# Patient Record
Sex: FEMALE | Race: OTHER | Employment: UNEMPLOYED | URBAN - METROPOLITAN AREA
[De-identification: names, ages, dates, MRNs, and addresses within clinical notes are randomized per-mention and may not be internally consistent; named-entity substitution may affect disease eponyms.]

---

## 2021-08-11 ENCOUNTER — HOSPITAL ENCOUNTER (OUTPATIENT)
Dept: LAB | Age: 51
Discharge: HOME | End: 2021-08-11

## 2021-08-11 ENCOUNTER — OFFICE VISIT (OUTPATIENT)
Dept: FAMILY MEDICINE CLINIC | Age: 51
End: 2021-08-11

## 2021-08-11 VITALS
WEIGHT: 166.6 LBS | DIASTOLIC BLOOD PRESSURE: 78 MMHG | TEMPERATURE: 98 F | SYSTOLIC BLOOD PRESSURE: 126 MMHG | HEART RATE: 81 BPM | BODY MASS INDEX: 28.44 KG/M2 | OXYGEN SATURATION: 100 % | HEIGHT: 64 IN

## 2021-08-11 DIAGNOSIS — I49.1 ECTOPIC ATRIAL RHYTHM: ICD-10-CM

## 2021-08-11 DIAGNOSIS — M25.562 ACUTE PAIN OF LEFT KNEE: Primary | ICD-10-CM

## 2021-08-11 DIAGNOSIS — M25.562 ACUTE PAIN OF LEFT KNEE: ICD-10-CM

## 2021-08-11 DIAGNOSIS — R00.2 PALPITATIONS: ICD-10-CM

## 2021-08-11 PROCEDURE — 84443 ASSAY THYROID STIM HORMONE: CPT

## 2021-08-11 PROCEDURE — 80053 COMPREHEN METABOLIC PANEL: CPT

## 2021-08-11 PROCEDURE — 83036 HEMOGLOBIN GLYCOSYLATED A1C: CPT

## 2021-08-11 PROCEDURE — 93000 ELECTROCARDIOGRAM COMPLETE: CPT | Performed by: FAMILY MEDICINE

## 2021-08-11 PROCEDURE — 85025 COMPLETE CBC W/AUTO DIFF WBC: CPT

## 2021-08-11 PROCEDURE — 99203 OFFICE O/P NEW LOW 30 MIN: CPT | Performed by: FAMILY MEDICINE

## 2021-08-11 RX ORDER — CELECOXIB 200 MG/1
200 CAPSULE ORAL 2 TIMES DAILY
Qty: 60 CAPSULE | Refills: 2 | Status: SHIPPED
Start: 2021-08-11 | End: 2021-10-11

## 2021-08-11 NOTE — PROGRESS NOTES
ECG on 8/11/21 at 1451 completed in error, reading was not correct. Reading repeated and recorded on 8/11/21 at [phone] with correct collection of rhythm.   Larisa Perez

## 2021-08-11 NOTE — PROGRESS NOTES
HISTORY OF PRESENT ILLNESS  Ondina Wyatt is a 53 y.o. female presenting with L knee pain and heart palpitations. Also states that her L eye gets red sometimes too. Has had knee pain for about one month. She doesn't remember any injury or fall before the onset of her symptoms. Kyle Addison most when she stands up after sitting for a while. She has not taken anything that helps with the pain but says sometimes massaging helps. Has had palpitations for about 2 months. Has them about 2 times per week. Go on for about 1 minute. No past medical history and takes no medications. No known allergies.      Review of Systems   Eyes: Positive for redness. Respiratory: Negative. Negative for cough and shortness of breath. Cardiovascular: Positive for palpitations. Negative for chest pain. Gastrointestinal: Negative. Negative for abdominal pain, constipation and diarrhea. Musculoskeletal: Positive for joint pain. Neurological: Negative for dizziness, tingling and headaches.      /78 (BP 1 Location: Right arm, BP Patient Position: Sitting)   Pulse 81   Temp 98 °F (36.7 °C)   Ht 5' 4.02\" (1.626 m)   Wt 166 lb 9.6 oz (75.6 kg)   LMP 07/29/2021   SpO2 100%   BMI 28.58 kg/m²      Physical Exam  Constitutional:       Appearance: Normal appearance. HENT:      Right Ear: Tympanic membrane and ear canal normal.      Left Ear: Tympanic membrane and ear canal normal.      Mouth/Throat:      Mouth: Mucous membranes are moist.   Eyes:      Conjunctiva/sclera: Conjunctivae normal.      Pupils: Pupils are equal, round, and reactive to light. Cardiovascular:      Rate and Rhythm: Normal rate and regular rhythm. Pulmonary:      Effort: Pulmonary effort is normal.      Breath sounds: Normal breath sounds. Abdominal:      General: Bowel sounds are normal. There is no distension. Tenderness: There is no abdominal tenderness. Musculoskeletal:      Cervical back: Neck supple.       Comments: Bilateral knees with full ROM, without swelling, and nontender to palpation. Some pain in L knee with anterior drawer test. Posterior drawer, McMurrays, varus and valgus stress tests all negative bilaterally. Skin:     General: Skin is warm and dry. Neurological:      Mental Status: She is alert.       ASSESSMENT and PLAN  Diagnoses and all orders for this visit:     1. Acute pain of left knee  -     CBC WITH AUTOMATED DIFF; Future  -     METABOLIC PANEL, COMPREHENSIVE; Future  -     TSH 3RD GENERATION; Future  -     HEMOGLOBIN A1C WITH EAG; Future     2. Palpitations  -     AMB POC EKG ROUTINE W/ 12 LEADS, INTER & REP     Other orders  -     celecoxib (CELEBREX) 200 mg capsule; Take 1 Capsule by mouth two (2) times a day.           Ms. Femi Hernandez is a 53year old with no significant past medical history who is presenting with 1m of L knee pain and 2m of heart palpitations. 1) Knee pain: Acute. Negative special tests making injury to ligament less likely. -prescribed celecoxib  -Recommended barr tennis shoes instead of sandals to wear around the house     2) Palpitations: twice a week lasting about 30s to 1min.  ECG normal  -ordered TSH, CBC, and CMP       -Hgb A1c for routine screening     Follow up in 1m

## 2021-08-11 NOTE — PROGRESS NOTES
*ATTENTION:  This note has been created by a medical student for educational purposes only. Please do not refer to the content of this note for clinical decision-making, billing, or other purposes. Please see attending physicians note to obtain clinical information on this patient. *         HISTORY OF PRESENT ILLNESS  Ondina Conte is a 53 y.o. female presenting with L knee pain and heart palpitations. Also states that her L eye gets red sometimes too. Has had knee pain for about one month. She doesn't remember any injury or fall before the onset of her symptoms. Arti Tim most when she stands up after sitting for a while. She has not taken anything that helps with the pain but says sometimes massaging helps. Has had palpitations for about 2 months. Has them about 2 times per week. Go on for about 1 minute. No past medical history and takes no medications. No known allergies. Review of Systems   Eyes: Positive for redness. Respiratory: Negative. Negative for cough and shortness of breath. Cardiovascular: Positive for palpitations. Negative for chest pain. Gastrointestinal: Negative. Negative for abdominal pain, constipation and diarrhea. Musculoskeletal: Positive for joint pain. Neurological: Negative for dizziness, tingling and headaches. /78 (BP 1 Location: Right arm, BP Patient Position: Sitting)   Pulse 81   Temp 98 °F (36.7 °C)   Ht 5' 4.02\" (1.626 m)   Wt 166 lb 9.6 oz (75.6 kg)   LMP 07/29/2021   SpO2 100%   BMI 28.58 kg/m²     Physical Exam  Constitutional:       Appearance: Normal appearance. HENT:      Right Ear: Tympanic membrane and ear canal normal.      Left Ear: Tympanic membrane and ear canal normal.      Mouth/Throat:      Mouth: Mucous membranes are moist.   Eyes:      Conjunctiva/sclera: Conjunctivae normal.      Pupils: Pupils are equal, round, and reactive to light. Cardiovascular:      Rate and Rhythm: Normal rate and regular rhythm. Pulmonary:      Effort: Pulmonary effort is normal.      Breath sounds: Normal breath sounds. Abdominal:      General: Bowel sounds are normal. There is no distension. Tenderness: There is no abdominal tenderness. Musculoskeletal:      Cervical back: Neck supple. Comments: Bilateral knees with full ROM, without swelling, and nontender to palpation. Some pain in L knee with anterior drawer test. Posterior drawer, McMurrays, varus and valgus stress tests all negative bilaterally. Skin:     General: Skin is warm and dry. Neurological:      Mental Status: She is alert. ASSESSMENT and PLAN  Diagnoses and all orders for this visit:    1. Acute pain of left knee  -     CBC WITH AUTOMATED DIFF; Future  -     METABOLIC PANEL, COMPREHENSIVE; Future  -     TSH 3RD GENERATION; Future  -     HEMOGLOBIN A1C WITH EAG; Future    2. Palpitations  -     AMB POC EKG ROUTINE W/ 12 LEADS, INTER & REP    Other orders  -     celecoxib (CELEBREX) 200 mg capsule; Take 1 Capsule by mouth two (2) times a day. Ms. Justino Baez is a 53year old with no significant past medical history who is presenting with 1m of L knee pain and 2m of heart palpitations. 1) Knee pain: Acute. Negative special tests making injury to ligament less likely. -prescribed celecoxib  -Recommended barr tennis shoes instead of sandals to wear around the house    2) Palpitations: twice a week lasting about 30s to 1min. No other symptoms but could be secondary to anemia vs thyroid disease vs other arrhythmia.  ECG normal.  -ordered TSH, CBC, and CMP    -Hgb A1c for routine screening    Follow up in [facility]  2:26 PM  08/11/21

## 2021-08-11 NOTE — PROGRESS NOTES
I reviewed with patient medications sent to pharmacy and gave Good RX card. I gave handout to patient that explains the Good RX process. An After Visit Summary was printed and reviewed with the patient.   Roosevelt John

## 2021-08-12 ENCOUNTER — VIRTUAL VISIT (OUTPATIENT)
Dept: FAMILY MEDICINE CLINIC | Age: 51
End: 2021-08-12

## 2021-08-12 ENCOUNTER — TELEPHONE (OUTPATIENT)
Dept: FAMILY MEDICINE CLINIC | Age: 51
End: 2021-08-12

## 2021-08-12 DIAGNOSIS — R71.8 TARGET CELLS PRESENT ON PERIPHERAL BLOOD SMEAR: ICD-10-CM

## 2021-08-12 DIAGNOSIS — Z71.89 COUNSELING AND COORDINATION OF CARE: Primary | ICD-10-CM

## 2021-08-12 DIAGNOSIS — D64.89 ANEMIA DUE TO OTHER CAUSE, NOT CLASSIFIED: Primary | ICD-10-CM

## 2021-08-12 LAB
ALBUMIN SERPL-MCNC: 3.7 G/DL (ref 3.5–5)
ALBUMIN/GLOB SERPL: 0.9 {RATIO} (ref 1.1–2.2)
ALP SERPL-CCNC: 65 U/L (ref 45–117)
ALT SERPL-CCNC: 24 U/L (ref 12–78)
ANION GAP SERPL CALC-SCNC: 3 MMOL/L (ref 5–15)
AST SERPL-CCNC: 13 U/L (ref 15–37)
BASOPHILS # BLD: 0 K/UL (ref 0–0.1)
BASOPHILS NFR BLD: 0 % (ref 0–1)
BILIRUB SERPL-MCNC: 0.3 MG/DL (ref 0.2–1)
BUN SERPL-MCNC: 12 MG/DL (ref 6–20)
BUN/CREAT SERPL: 21 (ref 12–20)
CALCIUM SERPL-MCNC: 9 MG/DL (ref 8.5–10.1)
CHLORIDE SERPL-SCNC: 108 MMOL/L (ref 97–108)
CO2 SERPL-SCNC: 28 MMOL/L (ref 21–32)
CREAT SERPL-MCNC: 0.58 MG/DL (ref 0.55–1.02)
DIFFERENTIAL METHOD BLD: ABNORMAL
EOSINOPHIL # BLD: 0.1 K/UL (ref 0–0.4)
EOSINOPHIL NFR BLD: 1 % (ref 0–7)
ERYTHROCYTE [DISTWIDTH] IN BLOOD BY AUTOMATED COUNT: 19.5 % (ref 11.5–14.5)
EST. AVERAGE GLUCOSE BLD GHB EST-MCNC: 120 MG/DL
GLOBULIN SER CALC-MCNC: 4.3 G/DL (ref 2–4)
GLUCOSE SERPL-MCNC: 83 MG/DL (ref 65–100)
HBA1C MFR BLD: 5.8 % (ref 4–5.6)
HCT VFR BLD AUTO: 27.1 % (ref 35–47)
HGB BLD-MCNC: 7.9 G/DL (ref 11.5–16)
IMM GRANULOCYTES # BLD AUTO: 0 K/UL (ref 0–0.04)
IMM GRANULOCYTES NFR BLD AUTO: 0 % (ref 0–0.5)
LYMPHOCYTES # BLD: 1.9 K/UL (ref 0.8–3.5)
LYMPHOCYTES NFR BLD: 34 % (ref 12–49)
MCH RBC QN AUTO: 19.1 PG (ref 26–34)
MCHC RBC AUTO-ENTMCNC: 29.2 G/DL (ref 30–36.5)
MCV RBC AUTO: 65.6 FL (ref 80–99)
MONOCYTES # BLD: 0.6 K/UL (ref 0–1)
MONOCYTES NFR BLD: 11 % (ref 5–13)
NEUTS SEG # BLD: 3.1 K/UL (ref 1.8–8)
NEUTS SEG NFR BLD: 54 % (ref 32–75)
NRBC # BLD: 0 K/UL (ref 0–0.01)
NRBC BLD-RTO: 0 PER 100 WBC
PLATELET # BLD AUTO: 273 K/UL (ref 150–400)
POTASSIUM SERPL-SCNC: 4.4 MMOL/L (ref 3.5–5.1)
PROT SERPL-MCNC: 8 G/DL (ref 6.4–8.2)
RBC # BLD AUTO: 4.13 M/UL (ref 3.8–5.2)
RBC MORPH BLD: ABNORMAL
SODIUM SERPL-SCNC: 139 MMOL/L (ref 136–145)
TSH SERPL DL<=0.05 MIU/L-ACNC: 1.54 UIU/ML (ref 0.36–3.74)
WBC # BLD AUTO: 5.7 K/UL (ref 3.6–11)

## 2021-08-12 RX ORDER — FERROUS SULFATE 325(65) MG
325 TABLET, DELAYED RELEASE (ENTERIC COATED) ORAL
Qty: 90 TABLET | Refills: 3 | Status: SHIPPED
Start: 2021-08-12 | End: 2021-10-11

## 2021-08-12 NOTE — TELEPHONE ENCOUNTER
Patient informed of test results. Hemoglobin is 7.9,  Low mcv, elevated RDW,  Also target cells present. She is not symptomatic. She has been told she was anemic before (10 years ago). We will refer to hematology for evaluation and management.   She will start taking iron tablets

## 2021-08-16 ENCOUNTER — OFFICE VISIT (OUTPATIENT)
Dept: FAMILY MEDICINE CLINIC | Age: 51
End: 2021-08-16

## 2021-08-16 DIAGNOSIS — Z71.89 COUNSELING AND COORDINATION OF CARE: Primary | ICD-10-CM

## 2021-08-16 PROCEDURE — 99080 SPECIAL REPORTS OR FORMS: CPT | Performed by: FAMILY MEDICINE

## 2021-08-16 NOTE — PROGRESS NOTES
AN financial screening has been completed. Patient has been instructed to call appointment line on or after 8/30/21.

## 2021-10-04 ENCOUNTER — OFFICE VISIT (OUTPATIENT)
Dept: ONCOLOGY | Age: 51
End: 2021-10-04
Payer: SUBSIDIZED

## 2021-10-04 VITALS
RESPIRATION RATE: 16 BRPM | BODY MASS INDEX: 29.3 KG/M2 | HEART RATE: 79 BPM | DIASTOLIC BLOOD PRESSURE: 89 MMHG | OXYGEN SATURATION: 97 % | SYSTOLIC BLOOD PRESSURE: 144 MMHG | HEIGHT: 64 IN | TEMPERATURE: 97.7 F | WEIGHT: 171.6 LBS

## 2021-10-04 DIAGNOSIS — R53.83 OTHER FATIGUE: ICD-10-CM

## 2021-10-04 DIAGNOSIS — M25.562 CHRONIC PAIN OF LEFT KNEE: ICD-10-CM

## 2021-10-04 DIAGNOSIS — G89.29 CHRONIC PAIN OF LEFT KNEE: ICD-10-CM

## 2021-10-04 DIAGNOSIS — D50.9 MICROCYTIC ANEMIA: Primary | ICD-10-CM

## 2021-10-04 PROCEDURE — 99204 OFFICE O/P NEW MOD 45 MIN: CPT | Performed by: INTERNAL MEDICINE

## 2021-10-04 NOTE — PROGRESS NOTES
Vitals:    10/04/21 1343   BP: (!) 144/89   Pulse: 79   Resp: 16   Temp: 97.7 °F (36.5 °C)   SpO2: 97%   Weight: 171 lb 9.6 oz (77.8 kg)   Height: 5' 4\" (1.626 m)         Chief Complaint   Patient presents with    New Patient     Ching Cannon is a 54 y/o female who presents as a new patient here today for anemia. She denies pain.

## 2021-10-04 NOTE — LETTER
10/5/2021    Patient: Lydia Escobar   YOB: 1970   Date of Visit: 10/4/2021     Pito Mejia, [address]  [address] [place] [address]  [place]ia [place] [employer]    Dear Pito Mejia MD,      Thank you for referring Ms. Lydia Escobar to [address] [facility] for evaluation. My notes for this consultation are attached. If you have questions, please do not hesitate to call me. I look forward to following your patient along with you.       Sincerely,    Nick Chu MD

## 2021-10-04 NOTE — PROGRESS NOTES
[facility] [place] at [address], [address] [facility] [address] [facility]  W: [phone]  F: [phone] Patient ID  Name: Cooper العلي  YOB: 1970  MRN: [id number]  Referring Provider:   Destin Altamirano, [address]  [address] [facility]  [address] [facility],  [facility] [address]  Primary Care Provider:   Destin Altamirano MD       HEMATOLOGY/MEDICAL ONCOLOGY  NOTE   Date of Visit: 10/04/21  Reason for Evaluation:     Chief Complaint   Patient presents with    New Patient     Renata Houser is a 52 y/o female who presents as a new patient here today for anemia. She denies pain. Subjective:     History of Present Illness:     Cooper العلي is a 53 y.o. F who presents for an initial evaluation for microcytic anemia. She per  denies any bleeding issues. She last had her menstrual period in September 2021. She has had a normal flow of her vaginal bleeding without bleeding in between periods. She notes a history of fatigue. She reports that she had anemia when she was 12-23 years old. She notes that she had iron deficiency   about 2 months ago. Patient reports that she is dealing with mainly fatigue. She denies any shortness   of breath at rest. Patient reports decreased oral intake on some occasions. She denies any unexplained weight  loss. Denies any nausea/vomiting/diarrhea currently  But has had some nausea in the past.   She notes taking oral iron pills. She did have some upset stomach while on iron. Patient denies any   bowel or bladder problems. Patient denies any pain. Patient reports some shortness of breath with some   activities.   -she has some headaches.  -last iron was taken one week ago.   -out of iron pills  -was prescribed celebrex for left knee pain but only took 1-2 days.  -denies vaginal bleeding outside of menstruation.  -she is here with her daughter, Jaden Rondon.  -she states that she was told that hse had iron deficiency. History reviewed. No pertinent past medical history. History reviewed. No pertinent surgical history. Social History     Tobacco Use    Smoking status: Never Smoker    Smokeless tobacco: Never Used   Substance Use Topics    Alcohol use: Never      Family History   Problem Relation Age of Onset    Kidney Disease Father     Anemia Neg Hx      Current Outpatient Medications   Medication Sig    ferrous sulfate (IRON) 325 mg (65 mg iron) EC tablet Take 1 Tablet by mouth three (3) times daily (with meals). (Patient not taking: Reported on 10/4/2021)    celecoxib (CELEBREX) 200 mg capsule Take 1 Capsule by mouth two (2) times a day. (Patient not taking: Reported on 10/4/2021)     No current facility-administered medications for this visit. No Known Allergies     Review of Systems provided by: patient  General: denies fever and reports fatigue  HEENT: denies epistaxis and denies trouble swallowing  Eyes: denies any vision loss and denies any eye pain  Cardio: denies any active chest pain and denies any leg swelling  Resp: denies any shortness of breath at rest and denies any hemoptysis  Abdomen: denies any nausea, denies any vomiting and denies any diarrhea. Denies hematochezia,melena. Lymph: denies any lymph node enlargement and denies any lymph node tenderness  Skin: denies any rash and denies any itching  MSK: denies any myalgias and denies any arthralgias  Neuro: denies any tremor and reports a history of headaches  Psych: does not report any depression or anxiety    Objective:     Visit Vitals  BP (!) 144/89   Pulse 79   Temp 97.7 °F (36.5 °C)   Resp 16   Ht 5' 4\" (1.626 m) Comment: Patient estimate   Wt 171 lb 9.6 oz (77.8 kg)   LMP 09/20/2021   SpO2 97%   BMI 29.46 kg/m²     ECOG PS: 1- Restricted in physically strenuous activity but ambulatory and able to carry out work of a light or sedentary nature, e.g., light house work, office work.   Physical Exam  Constitutional: No acute distress. , Non-toxic appearance. and Non-diaphoretic. HENT: Normocephalic and atraumatic head. Eyes: Normal Conjunctivae., Palpebral Conjunctivae without any significant Pallor. and Anicteric sclerae. Cardiovascular: Normal heart sounds., No pitting edema., No friction rub. and No gallops auscultated. Pulmonary: Normal Respiratory Effort., No wheezing., No rhonchi. and No rales. Abdominal: Normal bowel sounds. , Soft Abdomen to palpation. and No abdominal tenderness. Skin: No jaundice. and No rash. Musculoskeletal: No muscle pain on palpation. No temporal muscle wasting on inspection. Neurological: Alert and oriented to person, place, and time. Mental status is at baseline. , No tremor on inspection. and Normal Gait. Psychiatric: mood normal. normal speech rate and normal affect    Results:   I personally reviewed pertinent labs/results:   -No other labs. I personally reviewed Epic EHR labs/results below:   Lab Results   Component Value Date/Time    WBC 5.7 08/11/2021 07:45 PM    HGB 7.9 (L) 08/11/2021 07:45 PM    HCT 27.1 (L) 08/11/2021 07:45 PM    PLATELET 763 03/19/5386 07:45 PM    MCV 65.6 (L) 08/11/2021 07:45 PM    ABS. NEUTROPHILS 3.1 08/11/2021 07:45 PM     Lab Results   Component Value Date/Time    Sodium 139 08/11/2021 07:45 PM    Potassium 4.4 08/11/2021 07:45 PM    Chloride 108 08/11/2021 07:45 PM    CO2 28 08/11/2021 07:45 PM    Glucose 83 08/11/2021 07:45 PM    BUN 12 08/11/2021 07:45 PM    Creatinine 0.58 08/11/2021 07:45 PM    GFR est AA >60 08/11/2021 07:45 PM    GFR est non-AA >60 08/11/2021 07:45 PM    Calcium 9.0 08/11/2021 07:45 PM     I personally reviewed pertinent referral notes:  Note from Dr. Pito Mejia: \"Anemia, Low mcv, high RDW, target cells present, refer for evaluation and management\"  Assessment and Recommendations:     Diagnoses and all orders for this visit:    1. Microcytic anemia  56-year-old female with reported iron deficiency anemia.   In available outside records I do not see any iron labs but patient states that she was told she has iron deficiency and has recently completed a course of oral iron. I discussed with her that we need to repeat her iron stores to see if she has any current iron deficiency. I explained her that it is imperative that the root cause of any iron deficiency anemia be determined. We discussed that without any significant bleeding changes in her menstrual periods other etiologies need to be evaluated especially gastroenterology work-up. I would recommend that patient be referred to gastroenterology. She has never had a screening colonoscopy. Once I see that her Iron Stores are adequate, we may have to order a Hemoglobin Electrophoresis to evaluate for any hemoglobinopathy.  -     PERIPHERAL SMEAR; Future  -     FOLATE; Future  -     VITAMIN B12; Future  -     IRON PROFILE; Future  -     FERRITIN; Future  -     CBC WITH AUTOMATED DIFF; Future    2. Other fatigue  Patient's hemoglobin on 8/11/2021 was 7.9. We will repeat a CBC today to see if she has had any improvement. She still has some fatigue complaints. We discussed that if she is having ongoing bleeding then oral iron repletion may not be adequate and she may need parenteral IV iron sucrose weekly x3-4 treatments. 3. Chronic pain of left knee  No overt swelling at left knee site. Advised her to follow-up in primary care especially if her symptoms persist or she develops swelling concerning for any DVT which does not seem apparent at present. She has had this pain for about 2 months. There is some consideration that she was to take Celebrex but she only took 2 days. In the setting of possible GI bleeding as a cause of her iron deficiency I would recommend that patient proceed with avoiding NSAID's and aspirin if possible. Follow-up and Dispositions    · Return in about 5 weeks (around 11/8/2021).        Signed By:   Josie Cates MD

## 2021-10-05 PROBLEM — R53.83 OTHER FATIGUE: Status: ACTIVE | Noted: 2021-10-05

## 2021-10-05 PROBLEM — M25.562 CHRONIC PAIN OF LEFT KNEE: Status: ACTIVE | Noted: 2021-10-04

## 2021-10-05 PROBLEM — M25.562 CHRONIC PAIN OF LEFT KNEE: Status: ACTIVE | Noted: 2021-10-05

## 2021-10-05 PROBLEM — D50.9 MICROCYTIC ANEMIA: Status: ACTIVE | Noted: 2021-10-05

## 2021-10-05 PROBLEM — G89.29 CHRONIC PAIN OF LEFT KNEE: Status: ACTIVE | Noted: 2021-10-04

## 2021-10-05 PROBLEM — G89.29 CHRONIC PAIN OF LEFT KNEE: Status: ACTIVE | Noted: 2021-10-05

## 2021-10-11 ENCOUNTER — OFFICE VISIT (OUTPATIENT)
Dept: CARDIOLOGY CLINIC | Age: 51
End: 2021-10-11
Payer: SUBSIDIZED

## 2021-10-11 ENCOUNTER — TELEPHONE (OUTPATIENT)
Dept: CARDIOLOGY CLINIC | Age: 51
End: 2021-10-11

## 2021-10-11 VITALS
DIASTOLIC BLOOD PRESSURE: 60 MMHG | HEART RATE: 95 BPM | OXYGEN SATURATION: 99 % | BODY MASS INDEX: 29.37 KG/M2 | RESPIRATION RATE: 18 BRPM | HEIGHT: 64 IN | WEIGHT: 172 LBS | SYSTOLIC BLOOD PRESSURE: 100 MMHG

## 2021-10-11 DIAGNOSIS — R00.2 PALPITATIONS: Primary | ICD-10-CM

## 2021-10-11 DIAGNOSIS — D50.9 MICROCYTIC ANEMIA: ICD-10-CM

## 2021-10-11 PROCEDURE — 99204 OFFICE O/P NEW MOD 45 MIN: CPT | Performed by: INTERNAL MEDICINE

## 2021-10-11 PROCEDURE — 93000 ELECTROCARDIOGRAM COMPLETE: CPT | Performed by: INTERNAL MEDICINE

## 2021-10-11 NOTE — PROGRESS NOTES
Visit Vitals  /60   Pulse 75   Resp 18   Ht 5' 4\" (1.626 m)   Wt 172 lb (78 kg)   LMP 09/20/2021   SpO2 99%   BMI 29.52 kg/m²

## 2021-10-11 NOTE — LETTER
10/11/2021    Patient: Esther Lawler   YOB: 1970   Date of Visit: 10/11/2021     Felice Abbott, [address] [facility]  [address] [address]  [address] [place]    Dear Felice Abbott MD,      Thank you for referring Ms. Esther Lawler to [facility] for evaluation. My notes for this consultation are attached. If you have questions, please do not hesitate to call me. I look forward to following your patient along with you.       Sincerely,    Deena Borges MD

## 2021-10-11 NOTE — PROGRESS NOTES
Cheryln Denver, MD., [facility]    [address] [facility] [place], [address] [facility]    Office [phone],UZS [phone]           Ching Hartmann is a 53 y.o. female referred for evaluation of palpitations. Consult requested by Leana Almonte MD    CC - as documented in EMR    Dear Dr. Leana Almonte MD    I had the pleasure of seeing Ms. Efren Mehta in the office today. Assessment:   Palpitations  Anemia-hemoglobin 7.9 8/11/2021. Normal TSH. Plan:      Palpitations probably secondary to underlying anemia. Hemoglobin has improved from 7.9-10.3 (10/4/2021)  Echocardiogram.  Event monitor-7-day monitor  Followed by hematology-has been diagnosed with ideations anemia  Aggressive cardiovascular risk factor medication. Follow-up 6 to 8 weeks/earlier as needed. Reviewed PCP office note 8/11/2021-history of palpitations for approximately 2 months. Lab work 8/11/2021-K4.4, creatinine 0.58 TSH 1.54, hemoglobin 7.9,     Patient understands the plan. All questions were answered to the patient's satisfaction. I appreciate the opportunity to be involved in Ms. Nohelia Lowe. See note below for details. Please do not hesitate to contact us   with questions or concerns. Cheryln Denver, MD      Cardiac Testing/ Procedures: A. Cardiac Cath/PCI:    B.ECHO/KIRAN:    C.StressNuclear/Stress ECHO/Stress test:    D.Vascular:    E. EP:    F. Miscellaneous:    History:     Efren Mehta is a 53 y.o. female who is referred for evaluation of palpitations. Patient speaks some English but her daughter at the patient's request acts as a . Patient speaks Citizen of the [place]. History of intermittent palpitations for the past 2-1/2 months. Complains of weakness when she has the episodes. Can occur 2-3 times a week. She has to sit down when she has the palpitations. No dizziness/syncope. No chest pain/dyspnea. No swelling lower extremities.   No prior history of CAD/arrhythmia. Has been taking iron tablets for iron deficiency anemia. Past Medical/Surgical and Family/Social History:       PMHx - Iron def anemia    Family History   Problem Relation Age of Onset    Kidney Disease Father     Anemia Neg Hx       Social History     Tobacco Use    Smoking status: Never Smoker    Smokeless tobacco: Never Used   Substance Use Topics    Alcohol use: Never    Drug use: Never            Medications:       No current outpatient medications on file. No current facility-administered medications for this visit. Review of Systems:     (bold if positive, if negative)    Gen:   fatigueEyes:  ENT:  CVS:  Pulm:  GI:  :  MS:  arthritisSkin:  Psych:  anxietyEndo:  Hem:  Renal:  Neuro:  Physical Exam:     Visit Vitals  /60   Pulse 95   Resp 18   Ht 5' 4\" (1.626 m)   Wt 172 lb (78 kg)   LMP 09/20/2021   SpO2 99%   BMI 29.52 kg/m²          Gen: Well-developed, well-nourished, in no acute distress  Neck: Supple,No JVD, No Carotid Bruit,   Resp: No accessory muscle use, Clear breath sounds, No rales or rhonchi  Card: Regular Rate,Rythm,Normal S1, S2, No murmurs, rubs or gallop. No thrills. Abd:  Soft, non-tender, non-distended,BS+,   MSK: No cyanosis  Skin: No rashes    Neuro: moving all four extremities , follows commands appropriately  Psych:  Good insight, oriented to person, place , alert, Nml Affect  LE: No edema    EKG: Sinus rhythm, normal axis, normal intervals, nonspecific ST-T changes      Medication Side Effects and Warnings were discussed with patient: yes  Patient Labs were reviewed and/or requested:  yes  Patient Past Records were reviewed and/or requested: yes    Total time:       mins    ATTENTION:   This medical record was transcribed using an electronic medical records/speech recognition system. Although proofread, it may and can contain electronic, spelling and other errors. Corrections may be executed at a later time.   Please feel free to contact us for any clarifications as needed.       Wilfrido Pollard MD

## 2021-11-04 ENCOUNTER — DOCUMENTATION ONLY (OUTPATIENT)
Dept: ONCOLOGY | Age: 51
End: 2021-11-04

## 2021-11-04 NOTE — PROGRESS NOTES
[address] at [facility]  [phone]    11/04/2021 at 1:24 pm--This user attempted to call [facility] in regards to the referral and got a message stating that their office was closed for lunch from 1:00 pm-2:00pm. This user will attempt to call back at a later time. 11/04/2021 at 2:03 pm--This user called [facility], the office of Red Schaffer, and had to leave a voicemail message with our office information stating for someone to call me back about a mutual patient.

## 2021-11-04 NOTE — PROGRESS NOTES
Please see if patient has been referred to GI by Primary Care for Colonoscopy/ work-up for Iron Deficiency Anemia yet.     Thanks,    Dr. Daniel Distance

## 2021-11-09 DIAGNOSIS — D50.9 MICROCYTIC ANEMIA: Primary | ICD-10-CM

## 2021-11-10 ENCOUNTER — OFFICE VISIT (OUTPATIENT)
Dept: ONCOLOGY | Age: 51
End: 2021-11-10
Payer: SUBSIDIZED

## 2021-11-10 VITALS
TEMPERATURE: 96.6 F | WEIGHT: 174.4 LBS | BODY MASS INDEX: 29.78 KG/M2 | HEART RATE: 78 BPM | DIASTOLIC BLOOD PRESSURE: 84 MMHG | OXYGEN SATURATION: 100 % | SYSTOLIC BLOOD PRESSURE: 125 MMHG | HEIGHT: 64 IN

## 2021-11-10 DIAGNOSIS — D50.8 IRON DEFICIENCY ANEMIA SECONDARY TO INADEQUATE DIETARY IRON INTAKE: ICD-10-CM

## 2021-11-10 DIAGNOSIS — D50.0 IRON DEFICIENCY ANEMIA DUE TO CHRONIC BLOOD LOSS: Primary | ICD-10-CM

## 2021-11-10 PROCEDURE — 99213 OFFICE O/P EST LOW 20 MIN: CPT | Performed by: INTERNAL MEDICINE

## 2021-11-10 RX ORDER — FERROUS SULFATE 325(65) MG
325 TABLET, DELAYED RELEASE (ENTERIC COATED) ORAL 2 TIMES DAILY
Qty: 60 TABLET | Refills: 2 | Status: SHIPPED | OUTPATIENT
Start: 2021-11-10

## 2021-11-10 NOTE — PATIENT INSTRUCTIONS
PLEASE CALL US IF YOU DO NOT HEAR FROM GASTROENTEROLOGY BY NEXT WEEK SINCE YOU NEED TO HAVE AT LEAST A COLONOSCOPY. Orders Only on 10/04/2021   Component Date Value Ref Range Status    WBC 10/04/2021 6.4  3.6 - 11.0 K/uL Final    RBC 10/04/2021 4.80  3.80 - 5.20 M/uL Final    HGB 10/04/2021 10.3* 11.5 - 16.0 g/dL Final    HCT 10/04/2021 34.3* 35.0 - 47.0 % Final    MCV 10/04/2021 71.5* 80.0 - 99.0 FL Final    MCH 10/04/2021 21.5* 26.0 - 34.0 PG Final    MCHC 10/04/2021 30.0  30.0 - 36.5 g/dL Final    RDW 10/04/2021 23.7* 11.5 - 14.5 % Final    PLATELET 87/26/8863 756  150 - 400 K/uL Final    NRBC 10/04/2021 0.0  0  WBC Final    ABSOLUTE NRBC 10/04/2021 0.00  0.00 - 0.01 K/uL Final    NEUTROPHILS 10/04/2021 60  32 - 75 % Final    LYMPHOCYTES 10/04/2021 25  12 - 49 % Final    MONOCYTES 10/04/2021 11  5 - 13 % Final    EOSINOPHILS 10/04/2021 4  0 - 7 % Final    BASOPHILS 10/04/2021 0  0 - 1 % Final    IMMATURE GRANULOCYTES 10/04/2021 0  % Final    ABS. NEUTROPHILS 10/04/2021 3.8  1.8 - 8.0 K/UL Final    ABS. LYMPHOCYTES 10/04/2021 1.6  0.8 - 3.5 K/UL Final    ABS. MONOCYTES 10/04/2021 0.7  0.0 - 1.0 K/UL Final    ABS. EOSINOPHILS 10/04/2021 0.3  0.0 - 0.4 K/UL Final    ABS. BASOPHILS 10/04/2021 0.0  0.0 - 0.1 K/UL Final    ABS. IMM. GRANS.  10/04/2021 0.0  K/UL Final    DF 10/04/2021 MANUAL    Final    RBC COMMENTS 10/04/2021     Final                    Value:ANISOCYTOSIS  3+      RBC COMMENTS 10/04/2021     Final                    Value:HYPOCHROMIA  2+      RBC COMMENTS 10/04/2021     Final                    Value:MICROCYTOSIS  1+      Ferritin 10/04/2021 5* 26 - 388 NG/ML Final    Iron 10/04/2021 24* 35 - 150 ug/dL Final    TIBC 10/04/2021 489* 250 - 450 ug/dL Final    Iron % saturation 10/04/2021 5* 20 - 50 % Final    Vitamin B12 10/04/2021 685  193 - 986 pg/mL Final    Folate 10/04/2021 31.5* 5.0 - 21.0 ng/mL Final    PERIPHERAL SMEAR 10/04/2021 (NOTE)   Final APR-;PSM21 226     Review of the blood smear shows anemia with anisopoikilocytosis. Hypochromic microcytic forms, elliptocytes and target forms are noted. Schistocytes are not increased. There is minimal polychromasia and no   nucleated red blood cells are identified. Leukocytes are normal in number   and morphology. Platelets are unremarkable.

## 2021-11-10 NOTE — PROGRESS NOTES
[facility] at [address], [address] [facility] [facility]  Carole Ayush: [phone]  F: [phone] Patient ID  Name: Loida Mattson  YOB: 1970  MRN: [id number]  Referring Provider:   No referring provider defined for this encounter. Primary Care Provider:   Red Schaffer MD       HEMATOLOGY/MEDICAL ONCOLOGY  NOTE   Date of Visit: 11/10/21  Reason for Evaluation:     Chief Complaint   Patient presents with    Follow-up     5 weeks f/u   [id number]    Subjective:     History of Present Illness:     Loida Mattson is a 49 y.o. F who presents for a follow-up evaluation for Iron Deficiency Anemia. Patient reports doing well overall. She denies any fevers. She denies any significant fatigue. She does admit that she has not been on iron after she ran out. Apparently, she uses the mobile primary care [facility] for her primary care. She tells me that she has not been referred to gastroenterology yet. History reviewed. No pertinent past medical history. History reviewed. No pertinent surgical history. Social History     Tobacco Use    Smoking status: Never Smoker    Smokeless tobacco: Never Used   Substance Use Topics    Alcohol use: Never      Family History   Problem Relation Age of Onset    Kidney Disease Father     Anemia Neg Hx      No current outpatient medications on file. No current facility-administered medications for this visit. No Known Allergies     Review of Systems: A complete review of systems was obtained, reviewed. Pertinent findings reviewed above.       Objective:     Visit Vitals  /84 (BP 1 Location: Left upper arm, BP Patient Position: Sitting, BP Cuff Size: Adult)   Pulse (!) 41   Temp (!) 96.6 °F (35.9 °C) (Temporal)   Ht 5' 4\" (1.626 m)   Wt 174 lb 6.4 oz (79.1 kg)   LMP 10/13/2021 (Exact Date)   SpO2 100%   BMI 29.94 kg/m²     ECOG PS: 1- Restricted in physically strenuous activity but ambulatory and able to carry out work of a light or sedentary nature, e.g., light house work, office work. Physical Exam  Constitutional: No acute distress. , Non-toxic appearance. and Non-diaphoretic. HENT: Normocephalic and atraumatic head. Eyes: Normal Conjunctivae. and Palpebral Conjunctivae with Pallor. Cardiovascular: Normal heart sounds., No pitting edema., No friction rub. and No gallops auscultated. Pulmonary: Normal Respiratory Effort., No wheezing., No rhonchi. and No rales. Abdominal: Normal bowel sounds. , Soft Abdomen to palpation. , No abdominal tenderness., No guarding. and No rebound tenderness. Skin: No jaundice., No rash. and No petechiae. Musculoskeletal: No muscle pain on palpation. No temporal muscle wasting on inspection. Lymph: No cervical or supraclavicular lymph node enlargement or tenderness. Neurological: Alert and oriented to person, place, and time. Mental status is at baseline. and No tremor on inspection. Psychiatric: mood normal. normal speech rate and normal affect      Results:     Lab Results   Component Value Date/Time    WBC 6.4 10/04/2021 02:34 PM    HGB 10.3 (L) 10/04/2021 02:34 PM    HCT 34.3 (L) 10/04/2021 02:34 PM    PLATELET 453 32/70/3861 02:34 PM    MCV 71.5 (L) 10/04/2021 02:34 PM     Lab Results   Component Value Date/Time    Iron 24 (L) 10/04/2021 02:34 PM    TIBC 489 (H) 10/04/2021 02:34 PM    Iron % saturation 5 (L) 10/04/2021 02:34 PM    Ferritin 5 (L) 10/04/2021 02:34 PM     Lab Results   Component Value Date/Time    Vitamin B12 685 10/04/2021 02:34 PM    Folate 31.5 (H) 10/04/2021 02:34 PM         Assessment and Recommendations:     Diagnoses and all orders for this visit:    1. Iron deficiency anemia secondary to inadequate dietary iron intake  -     ferrous sulfate (IRON) 325 mg (65 mg iron) EC tablet; Take 1 Tablet by mouth two (2) times a day. Restart Oral Iron. Referred to Gastroenterology.   She will return in January 2022 for reassessment to see that her iron stores are improving. She has no difficulty taking oral iron other than access issue and she asks us to provide a prescription for her iron. She understands to take it 2x/day. More importantly, she understood the need to follow-through with Colonscopy evaluation and possibly EGD with GI. Follow-up and Dispositions    · Return in about 8 weeks (around 1/5/2022).              Signed By:   Peri Huggins MD

## 2021-11-10 NOTE — PROGRESS NOTES
Chief Complaint   Patient presents with    Follow-up     5 weeks f/u       Visit Vitals  /84 (BP 1 Location: Left upper arm, BP Patient Position: Sitting, BP Cuff Size: Adult)   Pulse (!) 41   Temp (!) 96.6 °F (35.9 °C) (Temporal)   Ht 5' 4\" (1.626 m)   Wt 174 lb 6.4 oz (79.1 kg)   LMP 10/13/2021 (Exact Date)   SpO2 100%   BMI 29.94 kg/m²       1. Have you been to the ER, urgent care clinic since your last visit? Hospitalized since your last visit? No    2. Have you seen or consulted any other health care providers outside of the [address] [place] [address] since your last visit? Include any pap smears or colon screening.  No

## 2021-11-10 NOTE — LETTER
11/15/2021    Patient: Jeff Garcia   YOB: 1970   Date of Visit: 11/10/2021     Nellie King, [address] [facility] [address]  [address] [facility],[address] [address]  [address] [employer]    Dear Nellie King MD,      Thank you for referring Ms. Jeff Garcia to [facility] for evaluation. My notes for this consultation are attached. If you have questions, please do not hesitate to call me. I look forward to following your patient along with you.       Sincerely,    Ino Saunders MD

## 2021-11-16 ENCOUNTER — TELEPHONE (OUTPATIENT)
Dept: ONCOLOGY | Age: 51
End: 2021-11-16

## 2021-11-16 NOTE — TELEPHONE ENCOUNTER
[address] at [place]  [phone]    11/16/21 11:15 AM -  Maria Luz Mccabe and spoke with the patient via  aHttie Zambrano - #[id number]). Advised patient now has GI appointment set up for Tuesday, December 7 at 2:45 PM.  Provided address to patient ([address] [place] [address]). Advised their office should be in touch with the patient soon to discuss information needed for records. Patient voiced understanding. No further questions or concerns.

## 2021-11-18 ENCOUNTER — DOCUMENTATION ONLY (OUTPATIENT)
Dept: CARDIOLOGY CLINIC | Age: 51
End: 2021-11-18

## 2021-11-18 NOTE — PROGRESS NOTES
Event monitor 11/1/2021-11/7/2021-  Minimum heart rate 59 bpm, maximum heart rate 129 bpm, no A. fib, no pauses,  Symptoms of skipped beats/flutter correlated with sinus rhythm/PVC    Next appointment 11/22/2021

## 2021-11-22 ENCOUNTER — ANCILLARY PROCEDURE (OUTPATIENT)
Dept: CARDIOLOGY CLINIC | Age: 51
End: 2021-11-22

## 2021-11-22 ENCOUNTER — OFFICE VISIT (OUTPATIENT)
Dept: CARDIOLOGY CLINIC | Age: 51
End: 2021-11-22
Payer: SUBSIDIZED

## 2021-11-22 VITALS
HEART RATE: 87 BPM | SYSTOLIC BLOOD PRESSURE: 120 MMHG | HEIGHT: 64 IN | WEIGHT: 176 LBS | DIASTOLIC BLOOD PRESSURE: 68 MMHG | BODY MASS INDEX: 30.05 KG/M2 | OXYGEN SATURATION: 100 %

## 2021-11-22 VITALS
SYSTOLIC BLOOD PRESSURE: 120 MMHG | DIASTOLIC BLOOD PRESSURE: 68 MMHG | WEIGHT: 174 LBS | BODY MASS INDEX: 29.71 KG/M2 | HEIGHT: 64 IN

## 2021-11-22 DIAGNOSIS — R00.2 PALPITATIONS: Primary | ICD-10-CM

## 2021-11-22 DIAGNOSIS — R00.2 PALPITATIONS: ICD-10-CM

## 2021-11-22 LAB
ECHO AO ASC DIAM: 3.07 CM
ECHO AO ROOT DIAM: 2.97 CM
ECHO AV AREA PEAK VELOCITY: 2.29 CM2
ECHO AV AREA VTI: 2.3 CM2
ECHO AV AREA/BSA PEAK VELOCITY: 1.2 CM2/M2
ECHO AV AREA/BSA VTI: 1.3 CM2/M2
ECHO AV MEAN GRADIENT: 5.49 MMHG
ECHO AV PEAK GRADIENT: 10.12 MMHG
ECHO AV PEAK VELOCITY: 159.02 CM/S
ECHO AV VTI: 30.34 CM
ECHO EST RA PRESSURE: 3 MMHG
ECHO IVC PROX: 1.58 CM
ECHO LA AREA 4C: 21.27 CM2
ECHO LA MAJOR AXIS: 3.89 CM
ECHO LA MINOR AXIS: 2.11 CM
ECHO LA VOL 2C: 67.71 ML (ref 22–52)
ECHO LA VOL 4C: 63.61 ML (ref 22–52)
ECHO LA VOL BP: 69.19 ML (ref 22–52)
ECHO LA VOL/BSA BIPLANE: 37.6 ML/M2 (ref 16–28)
ECHO LA VOLUME INDEX A2C: 36.8 ML/M2 (ref 16–28)
ECHO LA VOLUME INDEX A4C: 34.57 ML/M2 (ref 16–28)
ECHO LV E' LATERAL VELOCITY: 12.42 CM/S
ECHO LV E' SEPTAL VELOCITY: 9.63 CM/S
ECHO LV INTERNAL DIMENSION DIASTOLIC: 5.16 CM (ref 3.9–5.3)
ECHO LV INTERNAL DIMENSION SYSTOLIC: 3.14 CM
ECHO LV IVSD: 0.6 CM (ref 0.6–0.9)
ECHO LV MASS 2D: 111.6 G (ref 67–162)
ECHO LV MASS INDEX 2D: 60.7 G/M2 (ref 43–95)
ECHO LV POSTERIOR WALL DIASTOLIC: 0.71 CM (ref 0.6–0.9)
ECHO LVOT DIAM: 1.86 CM
ECHO LVOT PEAK GRADIENT: 7.22 MMHG
ECHO LVOT PEAK VELOCITY: 134.35 CM/S
ECHO LVOT SV: 69.9 ML
ECHO LVOT VTI: 25.83 CM
ECHO MV A VELOCITY: 79.93 CM/S
ECHO MV E DECELERATION TIME (DT): 187.89 MS
ECHO MV E VELOCITY: 97.48 CM/S
ECHO MV E/A RATIO: 1.22
ECHO MV E/E' LATERAL: 7.85
ECHO MV E/E' RATIO (AVERAGED): 8.99
ECHO MV E/E' SEPTAL: 10.12
ECHO MV EROA PISA: 0.17 CM2
ECHO MV MAX VELOCITY: 104.79 CM/S
ECHO MV MEAN GRADIENT: 2.07 MMHG
ECHO MV PEAK GRADIENT: 4.39 MMHG
ECHO MV PRESSURE HALF TIME (PHT): 54.49 MS
ECHO MV REGURGITANT RADIUS PISA: 0.57 CM
ECHO MV REGURGITANT VOLUME: 25.2 ML
ECHO MV REGURGITANT VTIA: 145.21 CM
ECHO MV VTI: 24.21 CM
ECHO RA AREA 4C: 19.29 CM2
ECHO RIGHT VENTRICULAR SYSTOLIC PRESSURE (RVSP): 31.68 MMHG
ECHO RV INTERNAL DIMENSION: 3.81 CM
ECHO RV TAPSE: 2.24 CM (ref 1.5–2)
ECHO TV REGURGITANT MAX VELOCITY: 267.76 CM/S
ECHO TV REGURGITANT PEAK GRADIENT: 28.68 MMHG
MR PISA PV: 498.55 CM/S

## 2021-11-22 PROCEDURE — 99213 OFFICE O/P EST LOW 20 MIN: CPT | Performed by: INTERNAL MEDICINE

## 2021-11-22 PROCEDURE — 93306 TTE W/DOPPLER COMPLETE: CPT | Performed by: INTERNAL MEDICINE

## 2021-11-22 NOTE — LETTER
11/22/2021    Patient: Daphney Mckinney   YOB: 1970   Date of Visit: 11/22/2021     Concha Serrano, [address] [place] [address]  [address] [place]    Dear Concha Serrano MD,      Thank you for referring Ms. Daphney Mckinney to [facility] for evaluation. My notes for this consultation are attached. If you have questions, please do not hesitate to call me. I look forward to following your patient along with you.       Sincerely,    Ronak Zaman MD

## 2021-11-22 NOTE — PROGRESS NOTES
Loida Mattson is a 49 y.o. female    Chief Complaint   Patient presents with    Follow-up     6 week f/u     Palpitations       Chest pain No; palpitations     SOB No    Dizziness No    Swelling No    Refills No    Visit Vitals  /68 (BP 1 Location: Left upper arm, BP Patient Position: Sitting)   Pulse 87   Ht 5' 4\" (1.626 m)   Wt 176 lb (79.8 kg)   SpO2 100%   BMI 30.21 kg/m²       1. Have you been to the ER, urgent care clinic since your last visit? Hospitalized since your last visit? No    2. Have you seen or consulted any other health care providers outside of the [address] [place] [address] since your last visit? Include any pap smears or colon screening.   No

## 2021-11-22 NOTE — PROGRESS NOTES
Nichole Aparicio MD., [facility]    [address] [place], [address] [facility]    Office [phone],K [phone]           [address] [place] [address] is here for a f/u office visit. Primary care physician:  Jose Burton MD    CC - as documented in EMR    Dear Dr. Jose Burton MD    I had the pleasure of seeing Ms. Gayla Mary Melrose in the office today. Assessment:     Palpitations - resolved  AnemiaOn iron supplements. Normal TSH      Plan:      Palpitations probably secondary to underlying anemia. Hemoglobin 10.3 (10/4/2021)  Echocardiogram - nml EF  Event monitor-7-day monitor -no sig arrhythymias  Followed by hematology-has been diagnosed with iron def anemia  Aggressive cardiovascular risk factor medication. Follow-up 1 yr/earlier as needed. Patient understands the plan. All questions were answered to the patient's satisfaction. I appreciate the opportunity to be involved in Ms. Amari Zepeda. See note below for details. Please do not hesitate to contact us with questions or concerns. Nichole Aparicio MD      Cardiac Testing/ Procedures: A. Cardiac Cath/PCI:     B.ECHO/KIRAN: 11/22/21 LV: Estimated LVEF is 65 - 70%. Visually measured ejection fraction. Normal cavity size, wall thickness, systolic function (ejection fraction normal) and diastolic function. Wall motion: normal.LA: Mildly dilated left atrium. Left Atrium volume index is 38 mL/m2. MV: Mild mitral valve regurgitation is present. TV: Mild tricuspid valve regurgitation is present. Right Ventricular Arterial Pressure (RVSP) is 32 mmHg. C.StressNuclear/Stress ECHO/Stress test:    D.Vascular:    E. EP:11/18/21 Event monitor 11/1/2021-11/7/2021-  Minimum heart rate 59 bpm, maximum heart rate 129 bpm, no A. fib, no pauses,  Symptoms of skipped beats/flutter correlated with sinus rhythm/PVC       F.  Miscellaneous:    History:     [address] [place] [address] is a 49 y.o. female who returns for follow up visit. No CP/dyspnea/swelling LE/palpitaitons  Daughter acts as     ( Initial visit 10/11/21 - Vickie Bianchi is a 53 y.o. female who is referred for evaluation of palpitations. Patient speaks some English but her daughter at the patient's request acts as a . Patient speaks Serbian. History of intermittent palpitations for the past 2-1/2 months. Complains of weakness when she has the episodes. Can occur 2-3 times a week. She has to sit down when she has the palpitations. No dizziness/syncope. No chest pain/dyspnea. No swelling lower extremities. No prior history of CAD/arrhythmia. Has been taking iron tablets for iron deficiency anemia.)    ROS:  (bold if positive, if negative)           Medications:       Current Outpatient Medications   Medication Sig Dispense    ferrous sulfate (IRON) 325 mg (65 mg iron) EC tablet Take 1 Tablet by mouth two (2) times a day. 60 Tablet     No current facility-administered medications for this visit. Family History of CAD:    No    Social History:  Current  Smoker  No    Physical Exam:     Visit Vitals  /68 (BP 1 Location: Left upper arm, BP Patient Position: Sitting)   Pulse 87   Ht 5' 4\" (1.626 m)   Wt 176 lb (79.8 kg)   SpO2 100%   BMI 30.21 kg/m²          Gen: Well-developed, well-nourished, in no acute distress  Neck: Supple,No JVD, No Carotid Bruit,   Resp: No accessory muscle use, Clear breath sounds, No rales or rhonchi  Card: Regular Rate,Rythm,Normal S1, S2, No murmurs, rubs or gallop. No thrills.    Abd:  Soft, non-tender, non-distended,BS+,   MSK: No cyanosis  Skin: No rashes    Neuro: moving all four extremities , follows commands appropriately  Psych:  Good insight, oriented to person, place , alert, Nml Affect  LE: No edema    EKG:       Medication Side Effects and Warnings were discussed with patient: yes  Patient Labs were reviewed and/or requested:  yes  Patient Past Records were reviewed and/or requested: yes    Total time :        mins    ATTENTION:   This medical record was transcribed using an electronic medical records/speech recognition system. Although proofread, it may and can contain electronic, spelling and other errors. Corrections may be executed at a later time. Please feel free to contact us for any clarifications as needed.       Lake Frausto MD

## 2021-12-07 ENCOUNTER — TELEPHONE (OUTPATIENT)
Dept: CARDIOLOGY CLINIC | Age: 51
End: 2021-12-07

## 2021-12-07 NOTE — TELEPHONE ENCOUNTER
Laury Gaucher with [employer] called to advise that the patient was billed for the monitor. She states [employer] will take the charges off if they attain a letter from the order provider stating that the office did not bill for the device.         [phone]:

## 2021-12-08 NOTE — TELEPHONE ENCOUNTER
L/m for Siddhartha Escobar returning call regarding the loop monitor she wore & doing a n/c. Need the fax# & who it is going to so I can do the letter.

## 2021-12-08 NOTE — TELEPHONE ENCOUNTER
Prasanna Kate from  [employer] called to provide the following information:         Send to: [employer] [facility]    Fax: [phone]    Email: [email or web address]

## 2021-12-09 NOTE — TELEPHONE ENCOUNTER
Spoke to our local [employer] rep about this kayleigh letter. After looking I had submitted it originally on 10/12/21 & was told it was approved. Sent him a copy of letter again. Jayshree Elkins will check into this.

## 2021-12-10 NOTE — TELEPHONE ENCOUNTER
Received message from Guillermo Pickering the [facility] rep that pts kayleigh loop was approved.

## 2022-01-06 ENCOUNTER — OFFICE VISIT (OUTPATIENT)
Dept: ONCOLOGY | Age: 52
End: 2022-01-06
Payer: SUBSIDIZED

## 2022-01-06 VITALS
RESPIRATION RATE: 18 BRPM | HEART RATE: 82 BPM | OXYGEN SATURATION: 99 % | DIASTOLIC BLOOD PRESSURE: 85 MMHG | WEIGHT: 179 LBS | TEMPERATURE: 98 F | BODY MASS INDEX: 30.56 KG/M2 | HEIGHT: 64 IN | SYSTOLIC BLOOD PRESSURE: 131 MMHG

## 2022-01-06 DIAGNOSIS — H57.89 RED EYE: ICD-10-CM

## 2022-01-06 DIAGNOSIS — D50.8 OTHER IRON DEFICIENCY ANEMIA: Primary | ICD-10-CM

## 2022-01-06 DIAGNOSIS — N92.0 MENORRHAGIA WITH REGULAR CYCLE: ICD-10-CM

## 2022-01-06 PROBLEM — Z79.899 HIGH RISK MEDICATION USE: Status: ACTIVE | Noted: 2022-01-06

## 2022-01-06 PROBLEM — D50.9 IRON DEFICIENCY ANEMIA: Status: ACTIVE | Noted: 2022-01-06

## 2022-01-06 PROCEDURE — 99214 OFFICE O/P EST MOD 30 MIN: CPT | Performed by: INTERNAL MEDICINE

## 2022-01-06 RX ORDER — DIPHENHYDRAMINE HYDROCHLORIDE 50 MG/ML
50 INJECTION, SOLUTION INTRAMUSCULAR; INTRAVENOUS AS NEEDED
Status: CANCELLED
Start: 2022-01-10

## 2022-01-06 RX ORDER — ACETAMINOPHEN 325 MG/1
650 TABLET ORAL AS NEEDED
Status: CANCELLED
Start: 2022-01-10

## 2022-01-06 RX ORDER — EPINEPHRINE 1 MG/ML
0.3 INJECTION, SOLUTION, CONCENTRATE INTRAVENOUS AS NEEDED
Status: CANCELLED | OUTPATIENT
Start: 2022-01-10

## 2022-01-06 RX ORDER — SODIUM CHLORIDE 9 MG/ML
10 INJECTION INTRAMUSCULAR; INTRAVENOUS; SUBCUTANEOUS AS NEEDED
Status: CANCELLED | OUTPATIENT
Start: 2022-01-10

## 2022-01-06 RX ORDER — DIPHENHYDRAMINE HYDROCHLORIDE 50 MG/ML
25 INJECTION, SOLUTION INTRAMUSCULAR; INTRAVENOUS AS NEEDED
Status: CANCELLED
Start: 2022-01-10

## 2022-01-06 RX ORDER — SODIUM CHLORIDE 0.9 % (FLUSH) 0.9 %
10 SYRINGE (ML) INJECTION AS NEEDED
Status: CANCELLED | OUTPATIENT
Start: 2022-01-10

## 2022-01-06 RX ORDER — HEPARIN 100 UNIT/ML
300-500 SYRINGE INTRAVENOUS AS NEEDED
Status: CANCELLED
Start: 2022-01-10

## 2022-01-06 RX ORDER — ONDANSETRON 2 MG/ML
8 INJECTION INTRAMUSCULAR; INTRAVENOUS AS NEEDED
Status: CANCELLED | OUTPATIENT
Start: 2022-01-10

## 2022-01-06 RX ORDER — HYDROCORTISONE SODIUM SUCCINATE 100 MG/2ML
100 INJECTION, POWDER, FOR SOLUTION INTRAMUSCULAR; INTRAVENOUS AS NEEDED
Status: CANCELLED | OUTPATIENT
Start: 2022-01-10

## 2022-01-06 RX ORDER — ALBUTEROL SULFATE 0.83 MG/ML
2.5 SOLUTION RESPIRATORY (INHALATION) AS NEEDED
Status: CANCELLED
Start: 2022-01-10

## 2022-01-06 NOTE — PROGRESS NOTES
[facility] [address], [address] [facility] [address] [place] Rank: [phone]  F: [phone] Patient ID  Name: Jing Rios  YOB: 1970  MRN: [id number]  Referring Provider:   No referring provider defined for this encounter. Primary Care Provider:   Ivy Wan MD       HEMATOLOGY/MEDICAL ONCOLOGY  NOTE   Date of Visit: 01/06/22  Reason for Evaluation:     Chief Complaint   Patient presents with    Anemia     Subjective:     History of Present Illness:     Jing Rios is a 49 y.o. F who presents for a follow-up evaluation for Iron Deficiency Anemia.   [employer] #[id number] intepreter used. Feels tired. Not taking iron oral pills; reportedly ran out of medication about 2 weeks ago. Denies any fevers. Has had red eye episodes over the past few months that comes and goes. Denies any known rheumatology conditions. Reportedly set for endoscopies with GI in February 2022. History reviewed. No pertinent past medical history. History reviewed. No pertinent surgical history. Social History     Tobacco Use    Smoking status: Never Smoker    Smokeless tobacco: Never Used   Substance Use Topics    Alcohol use: Never      Family History   Problem Relation Age of Onset    Kidney Disease Father     Anemia Neg Hx      Current Outpatient Medications   Medication Sig    ferrous sulfate (IRON) 325 mg (65 mg iron) EC tablet Take 1 Tablet by mouth two (2) times a day. (Patient not taking: Reported on 1/6/2022)     No current facility-administered medications for this visit. No Known Allergies     Review of Systems Provided by:  Patient  Review of Systems: A complete review of systems was obtained, reviewed. Pertinent findings reviewed above.       Objective:     Visit Vitals  /85   Pulse 82   Temp 98 °F (36.7 °C) (Temporal)   Resp 18   Ht 5' 4\" (1.626 m)   Wt 179 lb (81.2 kg)   SpO2 99%   BMI 30.73 kg/m²     ECOG PS: 1- Restricted in physically strenuous activity but ambulatory and able to carry out work of a light or sedentary nature, e.g., light house work, office work. Physical Exam  Constitutional: No acute distress. and Non-toxic appearance. HENT: Normocephalic and atraumatic head. and Wearing a mask during COVID-19 precautions. Eyes: Anicteric sclerae. and Left Lateral eye redness (see picture below)  Cardiovascular: Normal heart sounds. and No pitting edema. Pulmonary: Normal Respiratory Effort., No wheezing., No rhonchi. and No rales. Abdominal: Normal bowel sounds. , Soft Abdomen to palpation. and No abdominal tenderness. Skin: No jaundice. and No rash. Musculoskeletal: No muscle pain on palpation. No temporal muscle wasting on inspection. Neurological: Alert and oriented to person, place, and time. Mental status is at baseline. and No tremor on inspection. Psychiatric: mood normal. normal speech rate and normal affect    Media Information          Document Information    Photographic Image:  Mobile Media Capture   Left Eye   01/06/2022 11:05   Attached To:     Source Information    Elinor Rivera MD  [facility]     Results:     Lab Results   Component Value Date/Time    WBC 6.4 10/04/2021 02:34 PM    HGB 10.3 (L) 10/04/2021 02:34 PM    HCT 34.3 (L) 10/04/2021 02:34 PM    PLATELET 469 36/99/7757 02:34 PM    MCV 71.5 (L) 10/04/2021 02:34 PM     Lab Results   Component Value Date/Time    Iron 24 (L) 10/04/2021 02:34 PM    TIBC 489 (H) 10/04/2021 02:34 PM    Iron % saturation 5 (L) 10/04/2021 02:34 PM    Ferritin 5 (L) 10/04/2021 02:34 PM     Lab Results   Component Value Date/Time    Vitamin B12 685 10/04/2021 02:34 PM    Folate 31.5 (H) 10/04/2021 02:34 PM         Assessment and Recommendations:     Diagnoses and all orders for this visit:    1. Other iron deficiency anemia  -     CBC WITH AUTOMATED DIFF; Future  -     FERRITIN; Future  -     IRON PROFILE;  Future  -Discussed with patient and family in attendance at visit today that does not seem that she is taking her iron as recommended and keeping up with it. We discussed that the plan would be to proceed forward with systemic IV iron repletion. We we will repeat her ferritin today and her iron profile. Unless there is a significant improvement then we will proceed with IV iron since I am concerned she is not repleting appropriately. Strongly recommend that she keep her GI appointments. Today, I counseled patient about the risks associated with IV iron repletion. I provided her handout about IV iron in Telugu. She provided verbal oral consent to proceed with IV iron sucrose. We discussed that therapy would be weekly for several weeks. We discussed the risk of GI symptoms and rarely the risk of anaphylaxis. However, IV iron is generally tolerated well. 2. Red eye  -     REFERRAL TO OPHTHALMOLOGY  -New problem: Discussed with patient that I recommend she follow-up with an eye care provider. We will place the referral to ophthalmology as well to see if she needs evaluated for some type of scleritis. 3. Menorrhagia with regular cycle  -Continue recommend follow-up with gynecology. Follow-up and Dispositions    · Return in about 8 weeks (around 2/28/2022). SCHEDULE SUPPORTIVE PLAN  FOUR total weekly treatments Iron Sucrose starting 1/10/2021. SCHEDULE SUPPORTIVE PLAN  FOUR total weekly treatments Iron Sucrose starting 1/10/2021.     Print After Visit Summary    Signed By:   Wallie Goodpasture, MD

## 2022-01-06 NOTE — LETTER
1/10/2022    Patient: Freda Rouse   YOB: 1970   Date of Visit: 1/6/2022     German Corona MD  [facility] [address]  V[address]    Dear German Corona MD,      Thank you for referring Ms. Freda Rouse to [facility] for evaluation. My notes for this consultation are attached. If you have questions, please do not hesitate to call me. I look forward to following your patient along with you.       Sincerely,    Lincoln Falcon MD

## 2022-01-06 NOTE — PATIENT INSTRUCTIONS
Escleritis: Instrucciones de cuidado  Scleritis: Care Instructions  Instrucciones de cuidado    La esclerótica es la parte lucien del shanae. Es diogo cubierta fibrosa que constituye gran parte del globo ocular. La esclerótica protege las partes internas del shanae. Escleritis significa que la esclerótica está inflamada. La inflamación es lo que hace que la parte lucien del shanae se rashi enrojecida o, a veces, morada. El dolor de la escleritis suele ser intenso y es peor por la noche. El dolor puede extenderse a la christiana y la mandíbula. La escleritis puede [employer] ojos estén sensibles a la juan. También puede afectar la vista. En [place] graves, puede provocar pérdida de la vista. La escleritis a menudo está causada por otro problema médico, aditya la artritis reumatoide. A veces, la causa es diogo infección en el shanae. Para determinar si usted tiene escleritis, andres médico le revisa la vista. Kristina Hitch cuidadosamente los ojos. Andres médico también le pregunta sobre otros síntomas aditya dolor articular o episodios de [place]. Es posible que le rosa pruebas y radiografías para detectar problemas médicos que pueden causar escleritis. Cuando la escleritis está en la parte posterior del shanae, puede ser más difícil de diagnosticar. El tratamiento se concentra en reducir la inflamación. Andres médico puede darle un medicamento antiinflamatorio no esteroideo (Miguel Yanes). Si el problema es grave, un medicamento esteroideo podría ayudar. Rickie vez también necesite medicamentos para tratar la causa, aditya un antibiótico para diogo infección o medicamentos para problemas del sistema inmunitario. Con tratamiento, la escleritis a veces puede irse en pocas semanas. Kvng puede durar [employer], incluso [place] martina. La atención de seguimiento es diogo parte clave de andres tratamiento y seguridad. Asegúrese de hacer y acudir a todas las citas, y llame a andres médico si está teniendo problemas.  También es diogo buena idea saber los resultados de vida exámenes y mantener diogo lista de los medicamentos que candis. ¿Cómo puede cuidarse en el hogar? · Sea aruna con los medicamentos. Use vida medicamentos exactamente aditya le fueron recetados. Llame a andres médico si piensa que tiene un problema con andres medicamento. · Si está usando gotas para los ojos o [address] [place], asegúrese de que el gotero o el extremo del frasco esté limpio. Para ponerse gotas para los ojos o diogo pomada:  ? Incline la isaac para atrás y tire el párpado inferior hacia abajo con un dedo. ? Eche unas gotitas del medicamento o aplíquelo dentro del párpado inferior. ? Cierre el shanae por entre 30 y 61 segundos para dejar que las gotas o la pomada se deslicen por el shanae. ? No deje que la punta del gotero o del tubo de pomada toque vida pestañas ni ninguna otra superficie. · No use lentes de contacto sino hasta que el médico le diga que puede usarlos en forma blue nuevamente. · Use gafas de sol para ayudar a aliviar el dolor a causa de las luces hermilo. · Jordi a andres médico por controles con la frecuencia que tenga programada. ¿Cuándo debe pedir ayuda? Llame a andres médico ahora mismo o busque atención médica inmediata si:    · Usted nota que ve menos.     · Le cambia la vista.     · Tiene un dolor nuevo o peor en el shanae.     · Tiene un enrojecimiento nuevo o peor en el shanae. Preste especial atención a los cambios en andres niall y asegúrese de comunicarse con andres médico si:    · No mejora aditya se esperaba. ¿Dónde puede encontrar más información en inglés? Vaya a http://www.gray.com/  Krystian [id number] en la búsqueda para aprender más acerca de \"Escleritis: Instrucciones de cuidado. \"  Revisado: 29 abril, 2021               Versión del contenido: 13.0  © 2679-6110 Healthwise, Incorporated. Las instrucciones de cuidado fueron adaptadas bajo licencia por [employer] (which disclaims liability or warranty for this information).  Si usted tiene [place] afección médica o [employer] estas instrucciones, siempre pregunte a andres profesional de niall. [employer] niega toda garantía o responsabilidad por nadres uso de esta información. Afia sacarosa (Por inyección)   Trata la anemia (deficiencia de afia). Brendon(s) : [facility] Venofer, Venofer   Existen muchas otras marcas de [place]. Chuy medicamento no debe ser usado cuando:   Chuy medicamento no es adecuado para todas las personas. Usted no debería usarlo si ha tenido diogo reacción alérgica a la sacarosa de afia. Forma de usar chuy medicamento:   Claude Klein  · Andres médico le prescribirá andres dosis y horario. Chyu medicamento se administra a través de diogo aguja en la vena. · Марина Charlie u otro médico le administrará chuy medicamento. Medicamentos y [employer] que debe evitar:      Consulte con andres médico o farmacéutico antes de usar cualquier medicamento, incluyendo los que compra sin receta médica, las vitaminas y los productos herbales. Precauciones arie el uso de chuy medicamento:   · Informe a andres médico si usted está embarazada o dando de lactar, o si usted tiene presión arterial baja. · Chuy medicamento podría reducir andres presión arterial demasiado y puede provocarle mareos o desvanecimiento. Levántese o siéntese despacio si está mareado. · Chuy medicamento puede aumentar en exceso el nivel de afia en la [place]. El médico solicitará exámenes de laboratorio arie las citas de rutina para revisar los efectos de Joel. Asista a todas vida citas.   Efectos secundarios que pueden presentarse arie el uso de chuy medicamento:   Consulte inmediatamente con el médico si nota cualquiera de estos efectos secundarios:  · Reacción alérgica: Marzella Bloodgood o ronchas, hinchazón del patrick o las tony, hinchazón u hormigueo en la boca o garganta, opresión en el pecho, dificultad para respirar  · Dolor de pecho  · Desvanecimientos, mareos o desmayos  Consulte con el médico si nota los siguientes [place] secundarios menos graves:   · Diarrea, náuseas, vómitos  · Dolor de isaac  · [facility]  · Dolor en saravia [place]  · Dolor, picazón, ardor, inflamación o diogo protuberancia en saravia piel donde se colocó la aguja  Consulte con el médico si nota otros efectos secundarios que shayla son causados por chuy medicamento. Llame a saravia médico para consultarle Rashad. Usted puede notificar vida efectos secundarios al FDA al 3-930-RDX-0069. © 2017 [address] Information is for End User's use only and may not be sold, redistributed or otherwise used for commercial purposes. Esta información es sólo para uso en educación. Saravia intención no es darle un consejo médico sobre enfermedades o tratamientos. Colsulte con saravia [employer] [employer] farmacéutico antes de seguir cualquier régimen médico para saber si es seguro y efectivo para usted.

## 2022-01-06 NOTE — PROGRESS NOTES
Freda Rouse is a 49 y.o. female Follow up for the evaluation of Anemia. 1. Have you been to the ER, urgent care clinic since your last visit? Hospitalized since your last visit? No    2. Have you seen or consulted any other health care providers outside of the [address] [place] [address] since your last visit? Include any pap smears or colon screening.  No

## 2022-01-10 ENCOUNTER — HOSPITAL ENCOUNTER (OUTPATIENT)
Dept: INFUSION THERAPY | Age: 52
Discharge: HOME | End: 2022-01-10
Payer: SUBSIDIZED

## 2022-01-10 VITALS
RESPIRATION RATE: 18 BRPM | DIASTOLIC BLOOD PRESSURE: 65 MMHG | TEMPERATURE: 97 F | HEART RATE: 85 BPM | OXYGEN SATURATION: 97 % | SYSTOLIC BLOOD PRESSURE: 128 MMHG

## 2022-01-10 DIAGNOSIS — D50.8 OTHER IRON DEFICIENCY ANEMIA: Primary | ICD-10-CM

## 2022-01-10 PROCEDURE — 96365 THER/PROPH/DIAG IV INF INIT: CPT

## 2022-01-10 PROCEDURE — 74011250636 HC RX REV CODE- 250/636: Performed by: INTERNAL MEDICINE

## 2022-01-10 PROCEDURE — 74011000258 HC RX REV CODE- 258: Performed by: INTERNAL MEDICINE

## 2022-01-10 RX ORDER — SODIUM CHLORIDE 9 MG/ML
10 INJECTION INTRAMUSCULAR; INTRAVENOUS; SUBCUTANEOUS AS NEEDED
Status: DISCONTINUED | OUTPATIENT
Start: 2022-01-10 | End: 2022-01-11 | Stop reason: HOSPADM

## 2022-01-10 RX ORDER — HEPARIN 100 UNIT/ML
300-500 SYRINGE INTRAVENOUS AS NEEDED
Status: DISCONTINUED | OUTPATIENT
Start: 2022-01-10 | End: 2022-01-11 | Stop reason: HOSPADM

## 2022-01-10 RX ORDER — SODIUM CHLORIDE 0.9 % (FLUSH) 0.9 %
10 SYRINGE (ML) INJECTION AS NEEDED
Status: DISCONTINUED | OUTPATIENT
Start: 2022-01-10 | End: 2022-01-11 | Stop reason: HOSPADM

## 2022-01-10 RX ADMIN — IRON SUCROSE 200 MG: 20 INJECTION, SOLUTION INTRAVENOUS at 16:26

## 2022-01-10 NOTE — PROGRESS NOTES
[facility] Progress Note    Date: January 10, 2022    Name: Angeles Lee    MRN: [id number]         : 1970    Ms. Tracy Tim Arrived ambulatory and in no distress for Venofer Infusion. Assessment was completed alongside interpretor #[id number], patient reports mild headache and fatigue. 24 G PIV established to left arm, + blood return. Ms. Raul Smalls vitals were reviewed.   Visit Vitals  /70   Pulse 78   Temp 98.7 °F (37.1 °C)   Resp 18   SpO2 100%   Breastfeeding No     Medications:  Medications Administered     iron sucrose (VENOFER) 200 mg in 0.9% sodium chloride 100 mL, overfill volume 10 mL IVPB     Admin Date  01/10/2022 Action  New Bag Dose  200 mg Rate  120 mL/hr Route  IntraVENous Administered By  Kellee Way RN              SBAR given to [address] [place] [address] Street, RN  January 10, 2022

## 2022-01-10 NOTE — PROGRESS NOTES
SBAR received from [facility]. Pt is sitting without complaints, receiving infusion. Patient's daughter reports receiving informational handout from provider. VS  Patient Vitals for the past 12 hrs:   Temp Pulse Resp BP SpO2   01/10/22 1745 97 °F (36.1 °C) 85 18 128/65 97 %   01/10/22 1605 98.7 °F (37.1 °C) 78 18 133/70 100 %           1750 Patient tolerated treatment well. Patient discharged from [facility] 58 in no distress.  Patient aware of next appointment on 1/17 at 10

## 2022-01-17 ENCOUNTER — HOSPITAL ENCOUNTER (OUTPATIENT)
Dept: INFUSION THERAPY | Age: 52
Discharge: HOME | End: 2022-01-17
Payer: SUBSIDIZED

## 2022-01-17 VITALS
SYSTOLIC BLOOD PRESSURE: 121 MMHG | TEMPERATURE: 98.2 F | HEIGHT: 64 IN | OXYGEN SATURATION: 100 % | HEART RATE: 78 BPM | WEIGHT: 179.3 LBS | DIASTOLIC BLOOD PRESSURE: 70 MMHG | BODY MASS INDEX: 30.61 KG/M2 | RESPIRATION RATE: 16 BRPM

## 2022-01-17 DIAGNOSIS — D50.8 OTHER IRON DEFICIENCY ANEMIA: Primary | ICD-10-CM

## 2022-01-17 PROCEDURE — 74011000258 HC RX REV CODE- 258: Performed by: INTERNAL MEDICINE

## 2022-01-17 PROCEDURE — 74011000250 HC RX REV CODE- 250: Performed by: INTERNAL MEDICINE

## 2022-01-17 PROCEDURE — 74011250636 HC RX REV CODE- 250/636: Performed by: INTERNAL MEDICINE

## 2022-01-17 PROCEDURE — 96365 THER/PROPH/DIAG IV INF INIT: CPT

## 2022-01-17 RX ORDER — HYDROCORTISONE SODIUM SUCCINATE 100 MG/2ML
100 INJECTION, POWDER, FOR SOLUTION INTRAMUSCULAR; INTRAVENOUS AS NEEDED
Status: CANCELLED | OUTPATIENT
Start: 2022-01-17

## 2022-01-17 RX ORDER — DIPHENHYDRAMINE HYDROCHLORIDE 50 MG/ML
50 INJECTION, SOLUTION INTRAMUSCULAR; INTRAVENOUS AS NEEDED
Status: CANCELLED
Start: 2022-01-17

## 2022-01-17 RX ORDER — ACETAMINOPHEN 325 MG/1
650 TABLET ORAL AS NEEDED
Status: CANCELLED
Start: 2022-01-17

## 2022-01-17 RX ORDER — ALBUTEROL SULFATE 0.83 MG/ML
2.5 SOLUTION RESPIRATORY (INHALATION) AS NEEDED
Status: CANCELLED
Start: 2022-01-17

## 2022-01-17 RX ORDER — ONDANSETRON 2 MG/ML
8 INJECTION INTRAMUSCULAR; INTRAVENOUS AS NEEDED
Status: CANCELLED | OUTPATIENT
Start: 2022-01-17

## 2022-01-17 RX ORDER — DIPHENHYDRAMINE HYDROCHLORIDE 50 MG/ML
25 INJECTION, SOLUTION INTRAMUSCULAR; INTRAVENOUS AS NEEDED
Status: CANCELLED
Start: 2022-01-17

## 2022-01-17 RX ORDER — SODIUM CHLORIDE 0.9 % (FLUSH) 0.9 %
10 SYRINGE (ML) INJECTION AS NEEDED
Status: DISPENSED | OUTPATIENT
Start: 2022-01-17 | End: 2022-01-17

## 2022-01-17 RX ORDER — EPINEPHRINE 1 MG/ML
0.3 INJECTION, SOLUTION, CONCENTRATE INTRAVENOUS AS NEEDED
Status: CANCELLED | OUTPATIENT
Start: 2022-01-17

## 2022-01-17 RX ORDER — HEPARIN 100 UNIT/ML
300-500 SYRINGE INTRAVENOUS AS NEEDED
Status: CANCELLED
Start: 2022-01-17

## 2022-01-17 RX ORDER — SODIUM CHLORIDE 9 MG/ML
10 INJECTION INTRAMUSCULAR; INTRAVENOUS; SUBCUTANEOUS AS NEEDED
Status: CANCELLED | OUTPATIENT
Start: 2022-01-17

## 2022-01-17 RX ADMIN — Medication 10 ML: at 11:53

## 2022-01-17 RX ADMIN — Medication 10 ML: at 10:15

## 2022-01-17 RX ADMIN — IRON SUCROSE 200 MG: 20 INJECTION, SOLUTION INTRAVENOUS at 10:18

## 2022-01-17 NOTE — PROGRESS NOTES
[facility] Progress Note    Date: 2022    Name: Jean Santana    MRN: [id number]         : 1970    Ms. Ayesha Zuluaga arrived ambulatory and in no distress for C1D8 Venofer Infusion. Assessment was completed, no acute issues at this time, no new complaints voiced. 24 G PIV established to right arm, + blood return. Ms. Mac Bailey vitals were reviewed. Patient Vitals for the past 24 hrs:   Temp Pulse Resp BP SpO2   22 1150 98.2 °F (36.8 °C) 78 16 121/70 --   22 0958 98 °F (36.7 °C) 84 20 135/72 100 %         Medications:  Medications Administered     iron sucrose (VENOFER) 200 mg in 0.9% sodium chloride 100 mL, overfill volume 10 mL IVPB     Admin Date  2022 Action  New Bag Dose  200 mg Rate  120 mL/hr Route  IntraVENous Administered By  Melissa Frost RN          sodium chloride (NS) flush 10 mL     Admin Date  2022 Action  Given Dose  10 mL Route  IntraVENous Administered By  Melissa Frost RN           Admin Date  2022 Action  Given Dose  10 mL Route  IntraVENous Administered By  Melissa Frost RN                Ms. Ayesha Zuluaga tolerated treatment well and was discharged from [place] [address] in stable condition at 1255, after 20 minutes of post-infusion monitoring. PIV flushed & removed. She is to return on  at 1000 for her next appointment.     Ad Bradley RN  2022

## 2022-01-18 ENCOUNTER — TELEPHONE (OUTPATIENT)
Dept: ONCOLOGY | Age: 52
End: 2022-01-18

## 2022-01-18 NOTE — TELEPHONE ENCOUNTER
PT speaks Fijian and called about getting financial aide to help with her getting her infusion treatments- please advise a CB# [phone]

## 2022-01-24 ENCOUNTER — HOSPITAL ENCOUNTER (OUTPATIENT)
Dept: INFUSION THERAPY | Age: 52
Discharge: HOME | End: 2022-01-24
Payer: SUBSIDIZED

## 2022-01-24 VITALS
DIASTOLIC BLOOD PRESSURE: 75 MMHG | BODY MASS INDEX: 30.35 KG/M2 | RESPIRATION RATE: 16 BRPM | HEART RATE: 71 BPM | SYSTOLIC BLOOD PRESSURE: 128 MMHG | WEIGHT: 177.8 LBS | OXYGEN SATURATION: 100 % | HEIGHT: 64 IN | TEMPERATURE: 98.1 F

## 2022-01-24 DIAGNOSIS — D50.8 OTHER IRON DEFICIENCY ANEMIA: Primary | ICD-10-CM

## 2022-01-24 PROCEDURE — 74011000250 HC RX REV CODE- 250: Performed by: INTERNAL MEDICINE

## 2022-01-24 PROCEDURE — 74011000258 HC RX REV CODE- 258: Performed by: INTERNAL MEDICINE

## 2022-01-24 PROCEDURE — 96365 THER/PROPH/DIAG IV INF INIT: CPT

## 2022-01-24 PROCEDURE — 74011250636 HC RX REV CODE- 250/636: Performed by: INTERNAL MEDICINE

## 2022-01-24 RX ORDER — ONDANSETRON 2 MG/ML
8 INJECTION INTRAMUSCULAR; INTRAVENOUS AS NEEDED
Status: CANCELLED | OUTPATIENT
Start: 2022-01-24

## 2022-01-24 RX ORDER — SODIUM CHLORIDE 0.9 % (FLUSH) 0.9 %
10 SYRINGE (ML) INJECTION AS NEEDED
Status: DISPENSED | OUTPATIENT
Start: 2022-01-24 | End: 2022-01-24

## 2022-01-24 RX ORDER — DIPHENHYDRAMINE HYDROCHLORIDE 50 MG/ML
50 INJECTION, SOLUTION INTRAMUSCULAR; INTRAVENOUS AS NEEDED
Status: CANCELLED
Start: 2022-01-31

## 2022-01-24 RX ORDER — HEPARIN 100 UNIT/ML
300-500 SYRINGE INTRAVENOUS AS NEEDED
Status: ACTIVE | OUTPATIENT
Start: 2022-01-24 | End: 2022-01-24

## 2022-01-24 RX ORDER — DIPHENHYDRAMINE HYDROCHLORIDE 50 MG/ML
25 INJECTION, SOLUTION INTRAMUSCULAR; INTRAVENOUS AS NEEDED
Status: CANCELLED
Start: 2022-01-31

## 2022-01-24 RX ORDER — SODIUM CHLORIDE 9 MG/ML
10 INJECTION INTRAMUSCULAR; INTRAVENOUS; SUBCUTANEOUS AS NEEDED
Status: CANCELLED | OUTPATIENT
Start: 2022-01-31

## 2022-01-24 RX ORDER — SODIUM CHLORIDE 9 MG/ML
10 INJECTION INTRAMUSCULAR; INTRAVENOUS; SUBCUTANEOUS AS NEEDED
Status: CANCELLED | OUTPATIENT
Start: 2022-01-24

## 2022-01-24 RX ORDER — HYDROCORTISONE SODIUM SUCCINATE 100 MG/2ML
100 INJECTION, POWDER, FOR SOLUTION INTRAMUSCULAR; INTRAVENOUS AS NEEDED
Status: CANCELLED | OUTPATIENT
Start: 2022-01-24

## 2022-01-24 RX ORDER — SODIUM CHLORIDE 0.9 % (FLUSH) 0.9 %
10 SYRINGE (ML) INJECTION AS NEEDED
Status: CANCELLED | OUTPATIENT
Start: 2022-01-31

## 2022-01-24 RX ORDER — EPINEPHRINE 1 MG/ML
0.3 INJECTION, SOLUTION, CONCENTRATE INTRAVENOUS AS NEEDED
Status: CANCELLED | OUTPATIENT
Start: 2022-01-24

## 2022-01-24 RX ORDER — HEPARIN 100 UNIT/ML
300-500 SYRINGE INTRAVENOUS AS NEEDED
Status: CANCELLED
Start: 2022-01-31

## 2022-01-24 RX ORDER — HYDROCORTISONE SODIUM SUCCINATE 100 MG/2ML
100 INJECTION, POWDER, FOR SOLUTION INTRAMUSCULAR; INTRAVENOUS AS NEEDED
Status: CANCELLED | OUTPATIENT
Start: 2022-01-31

## 2022-01-24 RX ORDER — ONDANSETRON 2 MG/ML
8 INJECTION INTRAMUSCULAR; INTRAVENOUS AS NEEDED
Status: CANCELLED | OUTPATIENT
Start: 2022-01-31

## 2022-01-24 RX ORDER — ACETAMINOPHEN 325 MG/1
650 TABLET ORAL AS NEEDED
Status: CANCELLED
Start: 2022-01-31

## 2022-01-24 RX ORDER — ACETAMINOPHEN 325 MG/1
650 TABLET ORAL AS NEEDED
Status: CANCELLED
Start: 2022-01-24

## 2022-01-24 RX ORDER — HEPARIN 100 UNIT/ML
300-500 SYRINGE INTRAVENOUS AS NEEDED
Status: CANCELLED
Start: 2022-01-24

## 2022-01-24 RX ORDER — SODIUM CHLORIDE 9 MG/ML
10 INJECTION INTRAMUSCULAR; INTRAVENOUS; SUBCUTANEOUS AS NEEDED
Status: ACTIVE | OUTPATIENT
Start: 2022-01-24 | End: 2022-01-24

## 2022-01-24 RX ORDER — ALBUTEROL SULFATE 0.83 MG/ML
2.5 SOLUTION RESPIRATORY (INHALATION) AS NEEDED
Status: CANCELLED
Start: 2022-01-31

## 2022-01-24 RX ORDER — DIPHENHYDRAMINE HYDROCHLORIDE 50 MG/ML
50 INJECTION, SOLUTION INTRAMUSCULAR; INTRAVENOUS AS NEEDED
Status: CANCELLED
Start: 2022-01-24

## 2022-01-24 RX ORDER — EPINEPHRINE 1 MG/ML
0.3 INJECTION, SOLUTION, CONCENTRATE INTRAVENOUS AS NEEDED
Status: CANCELLED | OUTPATIENT
Start: 2022-01-31

## 2022-01-24 RX ORDER — SODIUM CHLORIDE 0.9 % (FLUSH) 0.9 %
10 SYRINGE (ML) INJECTION AS NEEDED
Status: CANCELLED | OUTPATIENT
Start: 2022-01-24

## 2022-01-24 RX ORDER — ALBUTEROL SULFATE 0.83 MG/ML
2.5 SOLUTION RESPIRATORY (INHALATION) AS NEEDED
Status: CANCELLED
Start: 2022-01-24

## 2022-01-24 RX ORDER — DIPHENHYDRAMINE HYDROCHLORIDE 50 MG/ML
25 INJECTION, SOLUTION INTRAMUSCULAR; INTRAVENOUS AS NEEDED
Status: CANCELLED
Start: 2022-01-24

## 2022-01-24 RX ADMIN — IRON SUCROSE 200 MG: 20 INJECTION, SOLUTION INTRAVENOUS at 11:08

## 2022-01-24 RX ADMIN — SODIUM CHLORIDE, PRESERVATIVE FREE 10 ML: 5 INJECTION INTRAVENOUS at 12:18

## 2022-01-24 RX ADMIN — SODIUM CHLORIDE, PRESERVATIVE FREE 10 ML: 5 INJECTION INTRAVENOUS at 10:40

## 2022-01-24 NOTE — PROGRESS NOTES
[facility] Progress Note    Date: 2022    Name: Tiny Morse    MRN: [id number]         : 1970    Ms. Gab Beltran arrived ambulatory and in no distress for C1D15 of Venofer infusion. Assessment was completed, no acute issues at this time, no new complaints voiced. 24 G PIV established to right arm without difficulty. Ms. Gina Barajas vitals were reviewed. Visit Vitals  /75 (BP 1 Location: Left upper arm, BP Patient Position: Sitting)   Pulse 71   Temp 98.1 °F (36.7 °C)   Resp 16   Ht 5' 4\" (1.626 m)   Wt 80.6 kg (177 lb 12.8 oz)   SpO2 100%   BMI 30.52 kg/m²       Medications:  Medications Administered     0.9% sodium chloride injection 10 mL     Admin Date  2022 Action  Given Dose  10 mL Route  IntraVENous Administered By  Marianna Calix RN          iron sucrose (VENOFER) 200 mg in 0.9% sodium chloride 100 mL, overfill volume 10 mL IVPB     Admin Date  2022 Action  New Bag Dose  200 mg Rate  120 mL/hr Route  IntraVENous Administered By  Marianna Calix RN          sodium chloride (NS) flush 10 mL     Admin Date  2022 Action  Given Dose  10 mL Route  IntraVENous Administered By  Marianna Calix RN                  Ms. Gab Beltran tolerated treatment well and was discharged from [place] [address] in stable condition . PIV flushed & removed. Discharge instructions reviewed with patient, verbalized understanding. Patient politely declined to stay 30 minutes post infusion for monitoring. She is to return on  at 1130 for her next appointment.     Manny Craig RN  2022

## 2022-01-31 ENCOUNTER — HOSPITAL ENCOUNTER (OUTPATIENT)
Dept: INFUSION THERAPY | Age: 52
Discharge: HOME | End: 2022-01-31
Payer: SUBSIDIZED

## 2022-01-31 VITALS
SYSTOLIC BLOOD PRESSURE: 132 MMHG | HEART RATE: 79 BPM | RESPIRATION RATE: 18 BRPM | OXYGEN SATURATION: 100 % | TEMPERATURE: 97.1 F | DIASTOLIC BLOOD PRESSURE: 60 MMHG

## 2022-01-31 DIAGNOSIS — D50.8 OTHER IRON DEFICIENCY ANEMIA: Primary | ICD-10-CM

## 2022-01-31 LAB
BASOPHILS # BLD: 0 K/UL (ref 0–0.1)
BASOPHILS NFR BLD: 1 % (ref 0–1)
DIFFERENTIAL METHOD BLD: ABNORMAL
EOSINOPHIL # BLD: 0 K/UL (ref 0–0.4)
EOSINOPHIL NFR BLD: 1 % (ref 0–7)
ERYTHROCYTE [DISTWIDTH] IN BLOOD BY AUTOMATED COUNT: 20.7 % (ref 11.5–14.5)
FERRITIN SERPL-MCNC: 133 NG/ML (ref 26–388)
HCT VFR BLD AUTO: 36.5 % (ref 35–47)
HGB BLD-MCNC: 11.2 G/DL (ref 11.5–16)
IMM GRANULOCYTES # BLD AUTO: 0 K/UL (ref 0–0.04)
IMM GRANULOCYTES NFR BLD AUTO: 0 % (ref 0–0.5)
IRON SATN MFR SERPL: 24 % (ref 20–50)
IRON SERPL-MCNC: 86 UG/DL (ref 35–150)
LYMPHOCYTES # BLD: 1.3 K/UL (ref 0.8–3.5)
LYMPHOCYTES NFR BLD: 28 % (ref 12–49)
MCH RBC QN AUTO: 25.5 PG (ref 26–34)
MCHC RBC AUTO-ENTMCNC: 30.7 G/DL (ref 30–36.5)
MCV RBC AUTO: 83 FL (ref 80–99)
MONOCYTES # BLD: 0.5 K/UL (ref 0–1)
MONOCYTES NFR BLD: 10 % (ref 5–13)
NEUTS SEG # BLD: 2.9 K/UL (ref 1.8–8)
NEUTS SEG NFR BLD: 60 % (ref 32–75)
NRBC # BLD: 0 K/UL (ref 0–0.01)
NRBC BLD-RTO: 0 PER 100 WBC
PLATELET # BLD AUTO: 209 K/UL (ref 150–400)
PMV BLD AUTO: 10.9 FL (ref 8.9–12.9)
RBC # BLD AUTO: 4.4 M/UL (ref 3.8–5.2)
RBC MORPH BLD: ABNORMAL
TIBC SERPL-MCNC: 360 UG/DL (ref 250–450)
WBC # BLD AUTO: 4.7 K/UL (ref 3.6–11)

## 2022-01-31 PROCEDURE — 96365 THER/PROPH/DIAG IV INF INIT: CPT

## 2022-01-31 PROCEDURE — 83540 ASSAY OF IRON: CPT

## 2022-01-31 PROCEDURE — 82728 ASSAY OF FERRITIN: CPT

## 2022-01-31 PROCEDURE — 74011250636 HC RX REV CODE- 250/636: Performed by: INTERNAL MEDICINE

## 2022-01-31 PROCEDURE — 74011000258 HC RX REV CODE- 258: Performed by: INTERNAL MEDICINE

## 2022-01-31 PROCEDURE — 85025 COMPLETE CBC W/AUTO DIFF WBC: CPT

## 2022-01-31 PROCEDURE — 36415 COLL VENOUS BLD VENIPUNCTURE: CPT

## 2022-01-31 RX ADMIN — IRON SUCROSE 200 MG: 20 INJECTION, SOLUTION INTRAVENOUS at 12:06

## 2022-01-31 NOTE — PROGRESS NOTES
[facility] Progress Note    Date: 2022    Name: El Cannon    MRN: [id number]         : 1970    Ms. Prateek Castrejon Arrived ambulatory and in no distress for Venofer Infusion. Assessment was completed, no acute issues at this time, no new complaints voiced. 24 G PIV established to right arm, + blood return. Labs drawn and sent for processing. Ms. Siddhartha Garnica vitals were reviewed. Visit Vitals  /60   Pulse 79   Temp 97.1 °F (36.2 °C)   Resp 18   SpO2 100%       Medications:  Medications Administered     iron sucrose (VENOFER) 200 mg in 0.9% sodium chloride 100 mL, overfill volume 10 mL IVPB     Admin Date  2022 Action  New Bag Dose  200 mg Rate  120 mL/hr Route  IntraVENous Administered By  Sabiha Costa RN                Ms. Prateek Castrejon tolerated treatment well and was discharged from [place] [address] in stable condition. PIV flushed & removed. She is aware of future appointments.     Future Appointments   Date Time Provider Rosario Grewal   2022 11:30 AM SS INF2 CH4 <2H [facility]   2022 11:30 AM SS INF2 CH4 <2H [facility]   2022  1:00 PM Charo Castellon MD [facility] BS AMB   2022  1:00 PM Andreia Iyer MD [facility] BS AMB       Devante Davis RN  2022

## 2022-02-07 ENCOUNTER — HOSPITAL ENCOUNTER (OUTPATIENT)
Dept: INFUSION THERAPY | Age: 52
End: 2022-02-07

## 2022-02-11 ENCOUNTER — TELEPHONE (OUTPATIENT)
Dept: ONCOLOGY | Age: 52
End: 2022-02-11

## 2022-02-11 NOTE — TELEPHONE ENCOUNTER
[address] at [facility]  [phone]    02/11/22 12:18 PM - Called and spoke with the patient's daughter, Mary Ghosh. Advised, per Dr. Ion Wan, the patient still needs to go have her colonoscopy done. Mary Ghosh voiced understanding and gratitude for the call. No further questions or concerns.

## 2022-02-11 NOTE — TELEPHONE ENCOUNTER
Patient daughters called (listed on PHI) and stated that patient is scheduled for a colonoscopy on 2/22 and patients daughter would like a call back to discuss if this appointment is still necessary since patients labs looked good at last visit.  Please advise     # [phone]

## 2022-02-14 ENCOUNTER — APPOINTMENT (OUTPATIENT)
Dept: INFUSION THERAPY | Age: 52
End: 2022-02-14

## 2022-02-15 ENCOUNTER — TELEPHONE (OUTPATIENT)
Dept: ONCOLOGY | Age: 52
End: 2022-02-15

## 2022-02-15 NOTE — TELEPHONE ENCOUNTER
[facility]  [phone]    02/15/22 9:56 AM - The referral and office note has been faxed to [facility] (Attn: Talib Gomez). [facility]  [phone]    02/15/22 10:08 AM - Donny White and spoke with Talib Gomez. Verified that once our office faxes [facility] the referral and office note, they will handle the auth and scheduling the appointment for the patient. No further questions or concerns.

## 2022-02-15 NOTE — TELEPHONE ENCOUNTER
Claribel Renteria with [facility] called and left VM stating that they need team to fax the referral and notes over to them to know why team referred this patient to gastro.  Please advise     FAX# [phone]

## 2022-02-28 ENCOUNTER — OFFICE VISIT (OUTPATIENT)
Dept: ONCOLOGY | Age: 52
End: 2022-02-28
Payer: SUBSIDIZED

## 2022-02-28 VITALS
RESPIRATION RATE: 18 BRPM | HEART RATE: 75 BPM | WEIGHT: 179 LBS | TEMPERATURE: 97.8 F | DIASTOLIC BLOOD PRESSURE: 83 MMHG | OXYGEN SATURATION: 100 % | SYSTOLIC BLOOD PRESSURE: 138 MMHG | HEIGHT: 64 IN | BODY MASS INDEX: 30.56 KG/M2

## 2022-02-28 DIAGNOSIS — H57.89 RED EYE: ICD-10-CM

## 2022-02-28 DIAGNOSIS — N92.0 MENORRHAGIA WITH REGULAR CYCLE: ICD-10-CM

## 2022-02-28 DIAGNOSIS — D50.8 OTHER IRON DEFICIENCY ANEMIA: Primary | ICD-10-CM

## 2022-02-28 PROCEDURE — 99213 OFFICE O/P EST LOW 20 MIN: CPT | Performed by: INTERNAL MEDICINE

## 2022-02-28 NOTE — LETTER
2/28/2022    Patient: Pradeep Thomason   YOB: 1970   Date of Visit: 2/28/2022     Anneliese García MD  [facility]  Via In Basket    Dear Anneliese García MD,      Thank you for referring Ms. Pradeep Thomason to [facility] for evaluation. My notes for this consultation are attached. If you have questions, please do not hesitate to call me. I look forward to following your patient along with you.       Sincerely,    Hieu Marie MD

## 2022-02-28 NOTE — PROGRESS NOTES
El Cannon is a 49 y.o. female follow up for iron deficiency anemia. 1. Have you been to the ER, urgent care clinic since your last visit? Hospitalized since your last visit?no     2. Have you seen or consulted any other health care providers outside of the [address] [place] [address] since your last visit? Include any pap smears or colon screening.  no

## 2022-02-28 NOTE — PROGRESS NOTES
[facility] at [place] [address] [address] [facility]  Soha Guardian: [phone]  F: [phone] Patient ID  Name: Darlene Solis  YOB: 1970  MRN: [id number]  Referring Provider:   No referring provider defined for this encounter. Primary Care Provider:   Mary Beth Barrera MD       HEMATOLOGY/MEDICAL ONCOLOGY  NOTE   Date of Visit: 02/28/22  Reason for Evaluation:     Chief Complaint   Patient presents with    Anemia     Subjective:     History of Present Illness:     Darlene Solis is a 49 y.o. F who presents for a follow-up evaluation for Iron Deficiency Anemia. She notes that she did not go for the colonscopy yet. She did not take the colon prep and it is rescheduled. She reports that she still has some fatigue. She reports that taking the Iron has given her more energy. She is still having intermittent red eye but her vision is without complaints. She did hear from ophtoalmology but her red eye did resolved. She reports having had cold sores in the Fall/Winter that resolve after one week. Fatigue:Grade 1  Rapid Heartbeat:Grade 1  Headache:Grade 1    No past medical history on file. No past surgical history on file. Social History     Tobacco Use    Smoking status: Never Smoker    Smokeless tobacco: Never Used   Substance Use Topics    Alcohol use: Never      Family History   Problem Relation Age of Onset    Kidney Disease Father     Anemia Neg Hx      Current Outpatient Medications   Medication Sig    ferrous sulfate (IRON) 325 mg (65 mg iron) EC tablet Take 1 Tablet by mouth two (2) times a day. (Patient not taking: Reported on 1/6/2022)     No current facility-administered medications for this visit. No Known Allergies     Review of Systems Provided by:  Patient  Review of Systems: A complete review of systems was obtained, reviewed. Pertinent findings reviewed above. Objective:      There were no vitals taken for this visit. ECOG PS: 1- Restricted in physically strenuous activity but ambulatory and able to carry out work of a light or sedentary nature, e.g., light house work, office work. Physical Exam  Constitutional: No acute distress. , Non-toxic appearance. and Non-diaphoretic. HENT: Normocephalic and atraumatic head. and Wearing a mask during COVID-19 precautions. Eyes: Normal Conjunctivae. Palpebral Conjunctivae without Pallor. Anicteric sclerae. Cardiovascular: S1,S2 auscultated. Pulmonary: Normal Respiratory Effort. No wheezing. No rhonchi. No rales. Abdominal: Normal bowel sounds. Soft Abdomen to palpation. No abdominal tenderness. Skin: No jaundice. No rash. Musculoskeletal: No muscle pain on palpation. No temporal muscle wasting on inspection. Neurological: Alert and oriented. No tremor on inspection. Normal Gait. Psychiatric: mood normal. normal speech rate normal affect    Results:     Lab Results   Component Value Date/Time    WBC 4.7 01/31/2022 11:46 AM    HGB 11.2 (L) 01/31/2022 11:46 AM    HCT 36.5 01/31/2022 11:46 AM    PLATELET 919 24/43/9587 11:46 AM    MCV 83.0 01/31/2022 11:46 AM     Lab Results   Component Value Date/Time    Iron 86 01/31/2022 11:46 AM    TIBC 360 01/31/2022 11:46 AM    Iron % saturation 24 01/31/2022 11:46 AM    Ferritin 133 01/31/2022 11:46 AM     Lab Results   Component Value Date/Time    Vitamin B12 685 10/04/2021 02:34 PM    Folate 31.5 (H) 10/04/2021 02:34 PM         Assessment and Recommendations:     1. Other iron deficiency anemia  -Discussed with patient that her iron parameters have normalized. Her hemoglobin is almost normal as well.  -We discussed follow-up in 3 months or sooner if needed. We reviewed alarm signs and symptoms of iron deficiency for her to contact our office. She seemed to understand this through the the [employer]  [address]. -She understood to keep her appointment for her colonoscopy.       2. Red eye  -Patient with intermittent red eye. I advised her to call back ophthalmology to reschedule the referral appointment.  -she reports a history of some oral lesions that sound consistent with fever blisters/cold sores that resolve after 7 days. She noted a demonstrated picture of a fever blister and felt that it represented her lesions.  -we discussed that I'm not sure what she has but it could be HSV lesions; we discussed that sometimes over the counter medications are used for cold sore/fever blisters. She will see Ophthalmology per her report. 3. Menorrhagia with regular cycle  -Patient does not report any significant bleeding issues. We discussed that we hope that her iron stores remain repleted. She will follow through with colonoscopy as well since I cannot say if her iron deficiency was related to menses. She will see us in 3 months      Follow-up and Dispositions    · Return in about 3 months (around 5/28/2022).            Marcella Real MD  Hematology/Medical Oncology Provider  [facility]  P: [phone]          Signed By:   Wallie Goodpasture, MD

## 2022-03-17 ENCOUNTER — TELEPHONE (OUTPATIENT)
Dept: ONCOLOGY | Age: 52
End: 2022-03-17

## 2022-03-17 NOTE — TELEPHONE ENCOUNTER
[address] at [place]  [phone]    03/17/22 3:34 PM - Heidy Ruizters and spoke with John Shook at [facility] to follow-up on the patient's referral.  She states they have sent the referral to the ophthalmology office several times to get the patient an appointment, but they have not heard back yet. They are trying to get the patient an appointment scheduled though. Advised this nurse will fax the most recent office note for the patient to fax #[phone]. John Shook voiced understanding and gratitude for the call. No further questions or concerns.

## 2022-03-18 PROBLEM — R53.83 OTHER FATIGUE: Status: ACTIVE | Noted: 2021-10-05

## 2022-03-19 PROBLEM — G89.29 CHRONIC PAIN OF LEFT KNEE: Status: ACTIVE | Noted: 2021-10-04

## 2022-03-19 PROBLEM — D50.9 IRON DEFICIENCY ANEMIA: Status: ACTIVE | Noted: 2022-01-06

## 2022-03-19 PROBLEM — H57.89 RED EYE: Status: ACTIVE | Noted: 2022-01-06

## 2022-03-19 PROBLEM — D50.9 MICROCYTIC ANEMIA: Status: ACTIVE | Noted: 2021-10-05

## 2022-03-19 PROBLEM — Z79.899 HIGH RISK MEDICATION USE: Status: ACTIVE | Noted: 2022-01-06

## 2022-03-19 PROBLEM — M25.562 CHRONIC PAIN OF LEFT KNEE: Status: ACTIVE | Noted: 2021-10-04

## 2022-03-20 PROBLEM — N92.0 MENORRHAGIA WITH REGULAR CYCLE: Status: ACTIVE | Noted: 2022-01-06

## 2022-05-31 NOTE — PROGRESS NOTES
[facility] at [address], [address] [place] [address] Minus: [phone]  F: [phone] Patient ID  Name: Sandra Shepard  YOB: 1970  MRN: [id number]  Referring Provider:   No referring provider defined for this encounter. Primary Care Provider:   Nessa Caballero MD       HEMATOLOGY/MEDICAL ONCOLOGY  NOTE   Date of Visit: 06/01/22  Reason for Evaluation:     Chief Complaint   Patient presents with    Follow-up     Patient presents as a follow-up for iron deficiency anemia. She denies pain. Subjective:     History of Present Illness:   , [place]. Sandra Shepard is a 49 y.o. F who presents for a follow-up evaluation for Iron Deficiency Anemia. Patient overall reports feeling stable. She states that she still has not had her colonoscopy yet. She states that she had to cancel the colonoscopies as she states that the colonoscopy prep was not ready at the pharmacy per patient report. She is unable to tell me why the colonoscopy prep was not ready for her. She did see the [facility] about her red eye but they did not have any immediate significant concerns fortunately. She reports that she still has had some heavy menses for her age. -  Fatigue:Grade 1    History reviewed. No pertinent past medical history. History reviewed. No pertinent surgical history. Social History     Tobacco Use    Smoking status: Never Smoker    Smokeless tobacco: Never Used   Substance Use Topics    Alcohol use: Never      Family History   Problem Relation Age of Onset    Kidney Disease Father     Anemia Neg Hx      Current Outpatient Medications   Medication Sig    ferrous sulfate (IRON) 325 mg (65 mg iron) EC tablet Take 1 Tablet by mouth two (2) times a day. (Patient not taking: Reported on 2/28/2022)     No current facility-administered medications for this visit.       No Known Allergies     Review of Systems Provided by: Patient  Review of Systems: A complete review of systems was obtained, reviewed. Pertinent findings reviewed above. Objective:     Visit Vitals  /77   Pulse 77   Temp 98.2 °F (36.8 °C)   Resp 16   Ht 5' 4\" (1.626 m)   Wt 182 lb 9.6 oz (82.8 kg)   SpO2 98%   BMI 31.34 kg/m²     ECOG PS: 1- Restricted in physically strenuous activity but ambulatory and able to carry out work of a light or sedentary nature, e.g., light house work, office work. Physical Exam  Constitutional: No acute distress. , Non-toxic appearance. and Non-diaphoretic. HENT: Normocephalic and atraumatic head. Eyes: Normal Conjunctivae. Palpebral Conjunctivae without Pallor. Anicteric sclerae. Cardiovascular: S1,S2 auscultated. No pitting edema. Pulmonary: Normal Respiratory Effort. No wheezing. No rhonchi. No rales. Abdominal: Normal bowel sounds. Soft Abdomen to palpation. No abdominal tenderness. Skin: No jaundice. No rash. Musculoskeletal: No muscle pain on palpation. No temporal muscle wasting on inspection. Neurological: Alert and oriented. No tremor on inspection. Normal Gait. Psychiatric: mood normal. normal speech rate normal affect      Results:     Lab Results   Component Value Date/Time    WBC 4.7 01/31/2022 11:46 AM    HGB 11.2 (L) 01/31/2022 11:46 AM    HCT 36.5 01/31/2022 11:46 AM    PLATELET 846 79/45/9231 11:46 AM    MCV 83.0 01/31/2022 11:46 AM     Lab Results   Component Value Date/Time    Iron 86 01/31/2022 11:46 AM    TIBC 360 01/31/2022 11:46 AM    Iron % saturation 24 01/31/2022 11:46 AM    Ferritin 133 01/31/2022 11:46 AM     Lab Results   Component Value Date/Time    Vitamin B12 685 10/04/2021 02:34 PM    Folate 31.5 (H) 10/04/2021 02:34 PM         Assessment and Recommendations:     1. Other iron deficiency anemia  -encouraged her to contact the GI provider's office to make sure that her colonoscopy is scheduled and prepared properly.  Also, to specifically inquire about the issue with her not having the colonoscopy prep available at the pharmacy on multiple attempts for the reported two canceled colonoscopies. -Recommend checking iron studies today.  -she reports that her energy level is similar since last visit. 2. Menorrhagia with regular cycle  -she reports more bleeding than expected for her age.   -she states that she is not on oral iron currently.   -will check CBC to evaluate for any worsening anemia.       Martita Bui MD  Hematology/Medical Oncology Provider  [facility]  P: [phone]    Signed By:   Billie Schofield MD

## 2022-06-01 ENCOUNTER — OFFICE VISIT (OUTPATIENT)
Dept: ONCOLOGY | Age: 52
End: 2022-06-01
Payer: SUBSIDIZED

## 2022-06-01 VITALS
RESPIRATION RATE: 16 BRPM | OXYGEN SATURATION: 98 % | SYSTOLIC BLOOD PRESSURE: 117 MMHG | HEIGHT: 64 IN | HEART RATE: 77 BPM | DIASTOLIC BLOOD PRESSURE: 77 MMHG | TEMPERATURE: 98.2 F | BODY MASS INDEX: 31.18 KG/M2 | WEIGHT: 182.6 LBS

## 2022-06-01 DIAGNOSIS — D50.8 OTHER IRON DEFICIENCY ANEMIA: Primary | ICD-10-CM

## 2022-06-01 DIAGNOSIS — N92.0 MENORRHAGIA WITH REGULAR CYCLE: ICD-10-CM

## 2022-06-01 PROCEDURE — 99214 OFFICE O/P EST MOD 30 MIN: CPT | Performed by: INTERNAL MEDICINE

## 2022-06-01 NOTE — LETTER
6/2/2022    Patient: Trini Pereira   YOB: 1970   Date of Visit: 6/1/2022     Margarita Ceja MD  [facility]  Via In Basket    Dear Margarita Ceja MD,      Thank you for referring Ms. Trini Pereira to [facility]  [facility] for evaluation. My notes for this consultation are attached. If you have questions, please do not hesitate to call me. I look forward to following your patient along with you.       Sincerely,    Jeyson Dumont MD

## 2022-06-01 NOTE — PROGRESS NOTES
1. Have you been to the ER, urgent care clinic since your last visit? Hospitalized since your last visit? No    2. Have you seen or consulted any other health care providers outside of the [address] [place] [address] since your last visit? Include any pap smears or colon screening. No        Vitals:    06/01/22 1313   BP: 117/77   Pulse: 77   Resp: 16   Temp: 98.2 °F (36.8 °C)   SpO2: 98%   Weight: 182 lb 9.6 oz (82.8 kg)   Height: 5' 4\" (1.626 m)           Chief Complaint   Patient presents with    Follow-up     Patient presents as a follow-up for iron deficiency anemia. She denies pain.

## 2022-06-18 ENCOUNTER — HOSPITAL ENCOUNTER (EMERGENCY)
Age: 52
Discharge: HOME | End: 2022-06-18
Attending: EMERGENCY MEDICINE

## 2022-06-18 VITALS
SYSTOLIC BLOOD PRESSURE: 133 MMHG | HEART RATE: 86 BPM | DIASTOLIC BLOOD PRESSURE: 86 MMHG | RESPIRATION RATE: 16 BRPM | TEMPERATURE: 98.1 F | OXYGEN SATURATION: 99 %

## 2022-06-18 DIAGNOSIS — B34.9 VIRAL SYNDROME: Primary | ICD-10-CM

## 2022-06-18 LAB
ALBUMIN SERPL-MCNC: 3.5 G/DL (ref 3.5–5)
ALBUMIN/GLOB SERPL: 0.7 {RATIO} (ref 1.1–2.2)
ALP SERPL-CCNC: 70 U/L (ref 45–117)
ALT SERPL-CCNC: 26 U/L (ref 12–78)
ANION GAP SERPL CALC-SCNC: 7 MMOL/L (ref 5–15)
AST SERPL-CCNC: 22 U/L (ref 15–37)
BASOPHILS # BLD: 0 K/UL (ref 0–0.1)
BASOPHILS NFR BLD: 0 % (ref 0–1)
BILIRUB SERPL-MCNC: 0.4 MG/DL (ref 0.2–1)
BUN SERPL-MCNC: 12 MG/DL (ref 6–20)
BUN/CREAT SERPL: 18 (ref 12–20)
CALCIUM SERPL-MCNC: 9 MG/DL (ref 8.5–10.1)
CHLORIDE SERPL-SCNC: 107 MMOL/L (ref 97–108)
CO2 SERPL-SCNC: 24 MMOL/L (ref 21–32)
COMMENT, HOLDF: NORMAL
CREAT SERPL-MCNC: 0.68 MG/DL (ref 0.55–1.02)
DIFFERENTIAL METHOD BLD: ABNORMAL
EOSINOPHIL # BLD: 0 K/UL (ref 0–0.4)
EOSINOPHIL NFR BLD: 0 % (ref 0–7)
ERYTHROCYTE [DISTWIDTH] IN BLOOD BY AUTOMATED COUNT: 13.6 % (ref 11.5–14.5)
GLOBULIN SER CALC-MCNC: 4.7 G/DL (ref 2–4)
GLUCOSE SERPL-MCNC: 107 MG/DL (ref 65–100)
HCT VFR BLD AUTO: 40.3 % (ref 35–47)
HGB BLD-MCNC: 13.1 G/DL (ref 11.5–16)
IMM GRANULOCYTES # BLD AUTO: 0 K/UL (ref 0–0.04)
IMM GRANULOCYTES NFR BLD AUTO: 0 % (ref 0–0.5)
LYMPHOCYTES # BLD: 1 K/UL (ref 0.8–3.5)
LYMPHOCYTES NFR BLD: 36 % (ref 12–49)
MCH RBC QN AUTO: 28.5 PG (ref 26–34)
MCHC RBC AUTO-ENTMCNC: 32.5 G/DL (ref 30–36.5)
MCV RBC AUTO: 87.8 FL (ref 80–99)
MONOCYTES # BLD: 0.4 K/UL (ref 0–1)
MONOCYTES NFR BLD: 15 % (ref 5–13)
NEUTS SEG # BLD: 1.4 K/UL (ref 1.8–8)
NEUTS SEG NFR BLD: 49 % (ref 32–75)
NRBC # BLD: 0 K/UL (ref 0–0.01)
NRBC BLD-RTO: 0 PER 100 WBC
PLATELET # BLD AUTO: 176 K/UL (ref 150–400)
PMV BLD AUTO: 10.8 FL (ref 8.9–12.9)
POTASSIUM SERPL-SCNC: 4 MMOL/L (ref 3.5–5.1)
PROT SERPL-MCNC: 8.2 G/DL (ref 6.4–8.2)
RBC # BLD AUTO: 4.59 M/UL (ref 3.8–5.2)
SAMPLES BEING HELD,HOLD: NORMAL
SODIUM SERPL-SCNC: 138 MMOL/L (ref 136–145)
WBC # BLD AUTO: 2.9 K/UL (ref 3.6–11)

## 2022-06-18 PROCEDURE — 74011250637 HC RX REV CODE- 250/637: Performed by: EMERGENCY MEDICINE

## 2022-06-18 PROCEDURE — 99284 EMERGENCY DEPT VISIT MOD MDM: CPT

## 2022-06-18 PROCEDURE — 36415 COLL VENOUS BLD VENIPUNCTURE: CPT

## 2022-06-18 PROCEDURE — 96360 HYDRATION IV INFUSION INIT: CPT

## 2022-06-18 PROCEDURE — 74011250636 HC RX REV CODE- 250/636: Performed by: EMERGENCY MEDICINE

## 2022-06-18 PROCEDURE — 85025 COMPLETE CBC W/AUTO DIFF WBC: CPT

## 2022-06-18 PROCEDURE — 80053 COMPREHEN METABOLIC PANEL: CPT

## 2022-06-18 RX ORDER — ACETAMINOPHEN 500 MG
1000 TABLET ORAL
Status: COMPLETED | OUTPATIENT
Start: 2022-06-18 | End: 2022-06-18

## 2022-06-18 RX ADMIN — SODIUM CHLORIDE 1000 ML: 9 INJECTION, SOLUTION INTRAVENOUS at 13:42

## 2022-06-18 RX ADMIN — ACETAMINOPHEN 1000 MG: 500 TABLET ORAL at 13:41

## 2022-06-18 NOTE — ED TRIAGE NOTES
Pt to ED for c/o body aches, HA, congestion and intermittent fever since Wednesday. Last does of tylenol 0430 and took Theraflu     Denies urinary, SOB, dizziness, CP.      Pt did not receive flu or covid vaccine     Pt requesting in triage to not be covid tested

## 2022-06-18 NOTE — ED PROVIDER NOTES
55-year-old female with a history of iron deficiency anemia requiring transfusion presents with fever, body aches, nasal congestion and rhinorrhea. She is not vaccinated for flu or COVID. She does not want a flu or COVID test today and does not think that that could be yet. She took last doses of Tylenol at 4:30 in the morning and took TheraFlu as well. She denies any urinary symptoms, shortness of breath, dizziness, chest pain. On chart review patient sees hematology for iron deficiency anemia      Generalized Body Aches    Fever          No past medical history on file. No past surgical history on file. Family History:   Problem Relation Age of Onset    Kidney Disease Father     Anemia Neg Hx        Social History     Socioeconomic History    Marital status:      Spouse name: Not on file    Number of children: Not on file    Years of education: Not on file    Highest education level: Not on file   Occupational History    Not on file   Tobacco Use    Smoking status: Never Smoker    Smokeless tobacco: Never Used   Vaping Use    Vaping Use: Never used   Substance and Sexual Activity    Alcohol use: Never    Drug use: Never    Sexual activity: Not on file   Other Topics Concern    Not on file   Social History Narrative    Social History:    Work at [address] [employer] Strain:     Difficulty of Paying Living Expenses: Not on file   Food Insecurity:     Worried About [facility] in the Last Year: Not on file    [address] in the Last Year: Not on file   Transportation Needs:     Lack of Transportation (Medical): Not on file    Lack of Transportation (Non-Medical):  Not on file   Physical Activity:     Days of Exercise per Week: Not on file    Minutes of Exercise per Session: Not on file   Stress:     Feeling of Stress : Not on file   Social Connections:     Frequency of Communication with Friends and Family: Not on file    Frequency of Social Gatherings with Friends and Family: Not on file    Attends Rastafarian Services: Not on file    Active Member of Clubs or Organizations: Not on file    Attends Club or Organization Meetings: Not on file    Marital Status: Not on file   Intimate Partner Violence:     Fear of Current or Ex-Partner: Not on file    Emotionally Abused: Not on file    Physically Abused: Not on file    Sexually Abused: Not on file   Housing Stability:     Unable to Pay for Housing in the Last Year: Not on file    Number of [place] in the Last Year: Not on file    Unstable Housing in the Last Year: Not on file         ALLERGIES: Patient has no known allergies. Review of Systems   Constitutional: Positive for fever. All other systems reviewed and are negative. Vitals:    06/18/22 1221   BP: (!) 140/80   Pulse: (!) 103   Resp: 16   Temp: 100 °F (37.8 °C)   SpO2: 95%            Physical Exam  Vitals and nursing note reviewed. Constitutional:       General: She is not in acute distress. HENT:      Head: Normocephalic and atraumatic. Mouth/Throat:      Mouth: Mucous membranes are moist.   Eyes:      General: No scleral icterus. Conjunctiva/sclera: Conjunctivae normal.      Pupils: Pupils are equal, round, and reactive to light. Neck:      Trachea: No tracheal deviation. Cardiovascular:      Rate and Rhythm: Normal rate and regular rhythm. Pulmonary:      Effort: Pulmonary effort is normal. No respiratory distress. Breath sounds: Normal breath sounds. No wheezing or rales. Abdominal:      General: There is no distension. Palpations: Abdomen is soft. Tenderness: There is no abdominal tenderness. Genitourinary:     Comments: deferred  Musculoskeletal:         General: No deformity. Cervical back: Neck supple. Skin:     General: Skin is warm and dry. Neurological:      General: No focal deficit present. Mental Status: She is alert.    Psychiatric: Mood and Affect: Mood normal.          [facility]  ED Course as of 06/18/22 1448   Sat Jun 18, 2022   1439 WBC(!): 2.9  White count slightly low consistent with viral infection. Hemoglobin stable. Patient declined COVID and influenza testing. Lungs are clear on examination. [TT]      ED Course User Index  [TT] Noelle Boyd MD       Procedures      2:48 PM  Patient re-evaluated. Vital signs improved with IV fluids. Patient resting comfortably no distress. All questions answered. Patient appropriate for discharge. Given return precautions and follow up instructions. LABORATORY TESTS:  Labs Reviewed   CBC WITH AUTOMATED DIFF - Abnormal; Notable for the following components:       Result Value    WBC 2.9 (*)     MONOCYTES 15 (*)     ABS. NEUTROPHILS 1.4 (*)     All other components within normal limits   METABOLIC PANEL, COMPREHENSIVE - Abnormal; Notable for the following components:    Glucose 107 (*)     Globulin 4.7 (*)     A-G Ratio 0.7 (*)     All other components within normal limits   SAMPLES BEING HELD       IMAGING RESULTS:  No orders to display       MEDICATIONS GIVEN:  Medications   sodium chloride 0.9 % bolus infusion 1,000 mL (0 mL IntraVENous IV Completed 6/18/22 1439)   acetaminophen (TYLENOL) tablet 1,000 mg (1,000 mg Oral Given 6/18/22 1341)       IMPRESSION:  1. Viral syndrome        PLAN:  1. Discharge Medication List as of 6/18/2022  2:41 PM        2. Follow-up Information     Follow up With Specialties Details Why Contact Info    Jaimee Maya MD Family Medicine Schedule an appointment as soon as possible for a visit   [facility]  [phone]      [facility] EMERGENCY DEPT Emergency Medicine  If symptoms worsen or new concerns [address] [place] [address]  [phone]        3. Return to ED for new or worsening symptoms       Dorothy Ortiz.  Barbara Samuel MD        Please note that this dictation was completed with Dragon, the computer voice recognition software. Quite often unanticipated grammatical, syntax, homophones, and other interpretive errors are inadvertently transcribed by the computer software. Please disregard these errors. Please excuse any errors that have escaped final proofreading.

## 2022-11-28 ENCOUNTER — OFFICE VISIT (OUTPATIENT)
Dept: CARDIOLOGY CLINIC | Age: 52
End: 2022-11-28

## 2022-11-28 VITALS
BODY MASS INDEX: 30.46 KG/M2 | HEIGHT: 64 IN | WEIGHT: 178.4 LBS | SYSTOLIC BLOOD PRESSURE: 128 MMHG | HEART RATE: 80 BPM | DIASTOLIC BLOOD PRESSURE: 84 MMHG | OXYGEN SATURATION: 99 %

## 2022-11-28 DIAGNOSIS — R00.2 PALPITATIONS: Primary | ICD-10-CM

## 2022-11-28 PROCEDURE — 99213 OFFICE O/P EST LOW 20 MIN: CPT | Performed by: INTERNAL MEDICINE

## 2022-11-28 PROCEDURE — 93000 ELECTROCARDIOGRAM COMPLETE: CPT | Performed by: INTERNAL MEDICINE

## 2022-11-28 NOTE — PROGRESS NOTES
Chief Complaint   Patient presents with    Follow-up     annual    Palpitations       Vitals:    11/28/22 1310   BP: 128/84   Pulse: 80   Height: 5' 4\" (1.626 m)   Weight: 178 lb 6.4 oz (80.9 kg)   SpO2: 99%         Chest pain: no    SOB: no    Palpitations: skipping    Dizziness: getting up fast    Swelling: no    Refills:       Have you been to the ER, urgent care clinic since your last visit? Hospitalized since your last visit? 6-18-22    Have you seen or consulted other health care providers outside of the [facility] system since your last visit?  (Include any pap smears or colon screening.)          [facility] #[id number]  Nai Chase  #[id number]

## 2022-11-28 NOTE — PROGRESS NOTES
Zena Shaw MD., [facility]    [address] Bet[place] Serrano, [address] [facility]    Office [phone],RXN [phone]           [address] [place] [address] is here for a f/u office visit. Primary care physician:  Mauricio Vinson MD    CC - as documented in EMR    Dear Dr. Mauricio Vinson MD    I had the pleasure of seeing Ms. [address] [place] [address] in the office today. Assessment:     Palpitations - resolved  AnemiaOn iron supplements. Normal TSH      Plan:      Palpitations probably secondary to underlying anemia. Hemoglobin 10.3 (10/4/2021)  Echocardiogram - nml EF  Event monitor-7-day monitor -no sig arrhythymias  Followed by hematology-has been diagnosed with iron def anemia - 6/18/22 - Hb 13.1  Aggressive cardiovascular risk factor medication. Follow-up 1 yr/earlier as needed. Patient understands the plan. All questions were answered to the patient's satisfaction. I appreciate the opportunity to be involved in Ms. Jaime Javier. See note below for details. Please do not hesitate to contact us with questions or concerns. Zena Shaw MD      Cardiac Testing/ Procedures: A. Cardiac Cath/PCI:     B.ECHO/KIRAN: 11/22/21 LV: Estimated LVEF is 65 - 70%. Visually measured ejection fraction. Normal cavity size, wall thickness, systolic function (ejection fraction normal) and diastolic function. Wall motion: normal.LA: Mildly dilated left atrium. Left Atrium volume index is 38 mL/m2. MV: Mild mitral valve regurgitation is present. TV: Mild tricuspid valve regurgitation is present. Right Ventricular Arterial Pressure (RVSP) is 32 mmHg. C.StressNuclear/Stress ECHO/Stress test:    D.Vascular:    E. EP:11/18/21 Event monitor 11/1/2021-11/7/2021-  Minimum heart rate 59 bpm, maximum heart rate 129 bpm, no A. fib, no pauses,  Symptoms of skipped beats/flutter correlated with sinus rhythm/PVC       F.  Miscellaneous:    History:     [address] [place] [address] is a 47 y.o. female who returns for follow up visit. No CP/dyspnea/swelling LE. Occ palpitaitons  Canadian intepreter    ( Initial visit 10/11/21 - Criselda Bumpers is a 53 y.o. female who is referred for evaluation of palpitations. Patient speaks some English but her daughter at the patient's request acts as a . Patient speaks Canadian. History of intermittent palpitations for the past 2-1/2 months. Complains of weakness when she has the episodes. Can occur 2-3 times a week. She has to sit down when she has the palpitations. No dizziness/syncope. No chest pain/dyspnea. No swelling lower extremities. No prior history of CAD/arrhythmia. Has been taking iron tablets for iron deficiency anemia.)    ROS:  (bold if positive, if negative)           Medications:       No current outpatient medications on file. No current facility-administered medications for this visit. Family History of CAD:    No    Social History:  Current  Smoker  No    Physical Exam:     Visit Vitals  /84   Pulse 80   Ht 5' 4\" (1.626 m)   Wt 178 lb 6.4 oz (80.9 kg)   SpO2 99%   BMI 30.62 kg/m²            Gen: Well-developed, well-nourished, in no acute distress  Neck: Supple,No JVD, No Carotid Bruit,   Resp: No accessory muscle use, Clear breath sounds, No rales or rhonchi  Card: Regular Rate,Rythm,Normal S1, S2, No murmurs, rubs or gallop. No thrills.    Abd:  Soft, non-tender, non-distended,BS+,   MSK: No cyanosis  Skin: No rashes    Neuro: moving all four extremities , follows commands appropriately  Psych:  Good insight, oriented to person, place , alert, Nml Affect  LE: No edema    EKG: SR/Nml axis/Nml intervals      Medication Side Effects and Warnings were discussed with patient: yes  Patient Labs were reviewed and/or requested:  yes  Patient Past Records were reviewed and/or requested: yes    Total time :        mins    ATTENTION:   This medical record was transcribed using an electronic medical records/speech recognition system. Although proofread, it may and can contain electronic, spelling and other errors. Corrections may be executed at a later time. Please feel free to contact us for any clarifications as needed.       Salud Cantor MD

## 2022-11-28 NOTE — LETTER
11/28/2022    Patient: Jerrell Jett   YOB: 1970   Date of Visit: 11/28/2022     Carlos Eduardo Anderson MD  [facility]  Via In Basket    Dear Carlos Eduardo Anderson MD,      Thank you for referring Ms. Jerrell Jett to [facility] for evaluation. My notes for this consultation are attached. If you have questions, please do not hesitate to call me. I look forward to following your patient along with you.       Sincerely,    Riaz Kearns MD

## 2023-01-11 ENCOUNTER — OFFICE VISIT (OUTPATIENT)
Dept: ONCOLOGY | Age: 53
End: 2023-01-11

## 2023-01-11 VITALS
SYSTOLIC BLOOD PRESSURE: 149 MMHG | OXYGEN SATURATION: 98 % | TEMPERATURE: 98.1 F | BODY MASS INDEX: 30.7 KG/M2 | WEIGHT: 179.8 LBS | HEIGHT: 64 IN | RESPIRATION RATE: 18 BRPM | DIASTOLIC BLOOD PRESSURE: 89 MMHG | HEART RATE: 75 BPM

## 2023-01-11 DIAGNOSIS — N92.0 MENORRHAGIA WITH REGULAR CYCLE: ICD-10-CM

## 2023-01-11 DIAGNOSIS — D50.8 OTHER IRON DEFICIENCY ANEMIA: Primary | ICD-10-CM

## 2023-01-11 PROCEDURE — 99214 OFFICE O/P EST MOD 30 MIN: CPT | Performed by: INTERNAL MEDICINE

## 2023-01-11 NOTE — PROGRESS NOTES
1. Have you been to the ER, urgent care clinic since your last visit? Hospitalized since your last visit? No    2. Have you seen or consulted any other health care providers outside of the [address] [place] [address] since your last visit? Include any pap smears or colon screening. No        Visit Vitals  BP (!) 149/89 (BP 1 Location: Right upper arm, BP Patient Position: Sitting, BP Cuff Size: Adult)   Pulse 75   Temp 98.1 °F (36.7 °C) (Oral)   Resp 18   Ht 5' 4\" (1.626 m)   Wt 179 lb 12.8 oz (81.6 kg)   SpO2 98%   BMI 30.86 kg/m²    Patient states she was dizzy this morning. She reports having headaches frequently in the morning and evening. Will update MD of above.           Chief Complaint   Patient presents with    Follow-up    Anemia

## 2023-01-11 NOTE — LETTER
1/11/2023    Patient: Savita Murilol   YOB: 1970   Date of Visit: 1/11/2023     Shahram Lugo MD  [facility]  Via In Basket    Dear Shahram Lugo MD,      Thank you for referring Ms. Savita Murillo to [facility] for evaluation. My notes for this consultation are attached. If you have questions, please do not hesitate to call me. I look forward to following your patient along with you.       Sincerely,    Raymon Trevino MD

## 2023-01-11 NOTE — PROGRESS NOTES
[facility] at [address], [address] [facility]: [phone]  F: [phone] Patient ID  Name: Pradeep Thomason  YOB: 1970  MRN: [id number]  Referring Provider:   No referring provider defined for this encounter. Primary Care Provider:   Rebekah Brown MD       HEMATOLOGY/MEDICAL ONCOLOGY  NOTE   Date of Visit: 01/11/23  Reason for Evaluation:     Chief Complaint   Patient presents with    Follow-up    Anemia     Subjective:     History of Present Illness:     [facility] Gabriel Kingsley is a 47 y.o. F who presents for a follow-up evaluation for a history of iron deficiency anemia. Her daughter is here and they say they weren't clear on the process for her to get the colonoscopy preparation and they never called GI back to try to reschedule it. Fatigue :Grade 1 Numbness/Tingling:pain in hands and knees. Headache: Gr 1 Date of Last Menstruation: 12/23/22     Patient overall reports feeling stable. History reviewed. No pertinent past medical history. History reviewed. No pertinent surgical history. Social History     Tobacco Use    Smoking status: Never    Smokeless tobacco: Never   Substance Use Topics    Alcohol use: Never      Family History   Problem Relation Age of Onset    Kidney Disease Father     Anemia Neg Hx      No current outpatient medications on file. No current facility-administered medications for this visit. No Known Allergies     Review of Systems Provided by:  Patient  Review of Systems: A complete review of systems was obtained, reviewed. Pertinent findings reviewed above.     Objective:     Visit Vitals  BP (!) 149/89 (BP 1 Location: Right upper arm, BP Patient Position: Sitting, BP Cuff Size: Adult)   Pulse 75   Temp 98.1 °F (36.7 °C) (Oral)   Resp 18   Ht 5' 4\" (1.626 m)   Wt 179 lb 12.8 oz (81.6 kg)   SpO2 98%   BMI 30.86 kg/m²     ECOG PS: 1- Restricted in physically strenuous activity but ambulatory and able to carry out work of a light or sedentary nature, e.g., light house work, office work. Physical Exam  Constitutional: No acute distress. and Non-toxic appearance. HENT: Normocephalic and atraumatic head. Eyes: Palpebral Conjunctivae with Mild Pallor. Anicteric sclerae. Cardiovascular: S1,S2 auscultated. No pitting edema. Pulmonary: Normal Respiratory Effort. No wheezing. No rhonchi. No rales. Abdominal: Normal bowel sounds. Soft Abdomen to palpation. No abdominal tenderness. Skin: No jaundice. No rash. Musculoskeletal: No muscle pain on palpation. No temporal muscle wasting on inspection. Neurological: Alert and oriented. No tremor on inspection. Normal Gait. Psychiatric: mood normal. normal speech rate. normal affect. Results:     Lab Results   Component Value Date/Time    WBC 2.9 (L) 06/18/2022 01:36 PM    HGB 13.1 06/18/2022 01:36 PM    HCT 40.3 06/18/2022 01:36 PM    PLATELET 543 84/91/5462 01:36 PM    MCV 87.8 06/18/2022 01:36 PM     Lab Results   Component Value Date/Time    Iron 59 06/01/2022 02:04 PM    TIBC 338 06/01/2022 02:04 PM    Iron % saturation 17 (L) 06/01/2022 02:04 PM    Ferritin 23 06/01/2022 02:04 PM     Lab Results   Component Value Date/Time    Vitamin B12 685 10/04/2021 02:34 PM    Folate 31.5 (H) 10/04/2021 02:34 PM     Assessment and Recommendations:     1. Other iron deficiency anemia  -will repeat iron studies. Has some mild palpebral conjunctival pallor. Has some neuropathy complaints as weel.  -highly encouraged for patient to establish with primary care provider now that she has insurance coverage.  -Also, encouraged her to follow-up with Gastroenterology about colonoscopy; requested  to help patient navigate issues with colonoscopy preparation.  - CBC WITH AUTOMATED DIFF; Future  - FERRITIN; Future  - IRON PROFILE; Future    2. Menorrhagia with regular cycle  -still having periods.  Bleeding varies; again, recommended establishing with primary care. Follow-up and Dispositions    Return in about 6 weeks (around 2/22/2023).            Marni Menjivar MD  Hematology/Medical Oncology Provider  [facility]  P: [phone]      Signed By:   Roslyn Dixon MD

## 2023-01-13 LAB
BASOPHILS # BLD AUTO: 0 X10E3/UL (ref 0–0.2)
BASOPHILS NFR BLD AUTO: 1 %
EOSINOPHIL # BLD AUTO: 0.1 X10E3/UL (ref 0–0.4)
EOSINOPHIL NFR BLD AUTO: 2 %
ERYTHROCYTE [DISTWIDTH] IN BLOOD BY AUTOMATED COUNT: 14.2 % (ref 11.7–15.4)
FERRITIN SERPL-MCNC: 12 NG/ML (ref 15–150)
HCT VFR BLD AUTO: 36 % (ref 34–46.6)
HGB BLD-MCNC: 11.5 G/DL (ref 11.1–15.9)
IMM GRANULOCYTES # BLD AUTO: 0 X10E3/UL (ref 0–0.1)
IMM GRANULOCYTES NFR BLD AUTO: 1 %
IRON SATN MFR SERPL: 10 % (ref 15–55)
IRON SERPL-MCNC: 46 UG/DL (ref 27–159)
LYMPHOCYTES # BLD AUTO: 1.9 X10E3/UL (ref 0.7–3.1)
LYMPHOCYTES NFR BLD AUTO: 29 %
MCH RBC QN AUTO: 26 PG (ref 26.6–33)
MCHC RBC AUTO-ENTMCNC: 31.9 G/DL (ref 31.5–35.7)
MCV RBC AUTO: 81 FL (ref 79–97)
MONOCYTES # BLD AUTO: 0.7 X10E3/UL (ref 0.1–0.9)
MONOCYTES NFR BLD AUTO: 10 %
NEUTROPHILS # BLD AUTO: 3.9 X10E3/UL (ref 1.4–7)
NEUTROPHILS NFR BLD AUTO: 57 %
PLATELET # BLD AUTO: 254 X10E3/UL (ref 150–450)
RBC # BLD AUTO: 4.43 X10E6/UL (ref 3.77–5.28)
TIBC SERPL-MCNC: 448 UG/DL (ref 250–450)
UIBC SERPL-MCNC: 402 UG/DL (ref 131–425)
WBC # BLD AUTO: 6.6 X10E3/UL (ref 3.4–10.8)

## 2024-02-16 ENCOUNTER — TELEPHONE (OUTPATIENT)
Age: 54
End: 2024-02-16

## 2024-02-16 NOTE — TELEPHONE ENCOUNTER
Pt daughter called in to schedule a follow up visit for the pt. She stated the pt has been feeling dizzy and weak. She thinks the pts iron maybe low. Pt is to come in on 2/19.   
No

## 2024-02-19 ENCOUNTER — OFFICE VISIT (OUTPATIENT)
Age: 54
End: 2024-02-19

## 2024-02-19 VITALS
WEIGHT: 172.4 LBS | HEART RATE: 79 BPM | DIASTOLIC BLOOD PRESSURE: 84 MMHG | OXYGEN SATURATION: 100 % | TEMPERATURE: 97.2 F | SYSTOLIC BLOOD PRESSURE: 132 MMHG | BODY MASS INDEX: 29.43 KG/M2 | RESPIRATION RATE: 18 BRPM | HEIGHT: 64 IN

## 2024-02-19 DIAGNOSIS — N92.0 EXCESSIVE AND FREQUENT MENSTRUATION WITH REGULAR CYCLE: ICD-10-CM

## 2024-02-19 DIAGNOSIS — D50.8 OTHER IRON DEFICIENCY ANEMIAS: Primary | ICD-10-CM

## 2024-02-19 DIAGNOSIS — D50.8 OTHER IRON DEFICIENCY ANEMIAS: ICD-10-CM

## 2024-02-19 PROCEDURE — 99214 OFFICE O/P EST MOD 30 MIN: CPT | Performed by: INTERNAL MEDICINE

## 2024-02-19 NOTE — PROGRESS NOTES
[facility] [place] at [facility]  [address] [address], [address] [place] [address]  W: [phone]  F: [phone] Patient ID  Name: Julisa Maciel  YOB: 1970  MRN: [id number]  Referring Provider:   No referring provider defined for this encounter.  Primary Care Provider:   Clifford Eisenberg MD       HEMATOLOGY/MEDICAL ONCOLOGY  NOTE     Reason for Evaluation:     Chief Complaint   Patient presents with    Follow-up      Lelo  [id number]  Subjective:     History of Present Illness:   Date of Visit: 02/19/24   Julisa Maciel is a 53 y.o. female who presents for a follow-up evaluation for a history of iron deficiency..   Patient overall reports feeling worse. She reports feeling fatigued for most of the day. She also has headaches.  Deneis any ice cravings or numbness in legs.   She reports that she may have lost some weight recently. She has had some recent weight gain back. She denies any bleeding or dark stools. She denies any heavy vaginal bleeding issues. She notes that she is still with menstrual period but with no vaginal bleeding intermittently.   No current outpatient medications  No Known Allergies    Review of Systems Provided by:  Patient  Review of Systems: A complete review of systems was obtained, reviewed.  Pertinent findings reviewed above.  Past medical history, social history, and family history  are located in the electronic medical record.  Objective:     Vitals:    02/19/24 1215   BP: 132/84   Pulse: 79   Resp: 18   Temp: 97.2 °F (36.2 °C)   SpO2: 100%     Physical Exam  Constitutional: No acute distress. and Non-toxic appearance.  Eyes: Anicteric sclerae.  Cardiovascular: S1,S2 auscultated. No pitting edema.  Pulmonary: Normal Respiratory Effort. No wheezing. No rhonchi. No rales.   Abdominal: Normal bowel sounds. Soft Abdomen to palpation. No abdominal tenderness.   Skin: No jaundice. No rash.   Neurological: Alert and

## 2024-02-19 NOTE — PROGRESS NOTES
Chief Complaint   Patient presents with    Follow-up           Vitals:    02/19/24 1215   BP: 132/84   Pulse: 79   Resp: 18   Temp: 97.2 °F (36.2 °C)   SpO2: 100%      Patient refused  for this users part, agreed to use it for MD part.  #[id number], Session code 39711.       1. Have you been to the ER, urgent care clinic since your last visit?  Hospitalized since your last visit?  No  2. Have you seen or consulted any other health care providers outside of the [facility] since your last visit?  Include any pap smears or colon screening. No

## 2024-02-20 LAB
BASOPHILS # BLD: 0 K/UL (ref 0–0.1)
BASOPHILS NFR BLD: 1 % (ref 0–1)
DIFFERENTIAL METHOD BLD: ABNORMAL
EOSINOPHIL # BLD: 0 K/UL (ref 0–0.4)
EOSINOPHIL NFR BLD: 1 % (ref 0–7)
ERYTHROCYTE [DISTWIDTH] IN BLOOD BY AUTOMATED COUNT: 15.9 % (ref 11.5–14.5)
FERRITIN SERPL-MCNC: 4 NG/ML (ref 8–252)
HCT VFR BLD AUTO: 29.2 % (ref 35–47)
HGB BLD-MCNC: 8.9 G/DL (ref 11.5–16)
IMM GRANULOCYTES # BLD AUTO: 0 K/UL (ref 0–0.04)
IMM GRANULOCYTES NFR BLD AUTO: 0 % (ref 0–0.5)
IRON SATN MFR SERPL: 4 % (ref 20–50)
IRON SERPL-MCNC: 17 UG/DL (ref 35–150)
LYMPHOCYTES # BLD: 1.5 K/UL (ref 0.8–3.5)
LYMPHOCYTES NFR BLD: 27 % (ref 12–49)
MCH RBC QN AUTO: 23.1 PG (ref 26–34)
MCHC RBC AUTO-ENTMCNC: 30.5 G/DL (ref 30–36.5)
MCV RBC AUTO: 75.6 FL (ref 80–99)
MONOCYTES # BLD: 0.5 K/UL (ref 0–1)
MONOCYTES NFR BLD: 9 % (ref 5–13)
NEUTS SEG # BLD: 3.5 K/UL (ref 1.8–8)
NEUTS SEG NFR BLD: 62 % (ref 32–75)
NRBC # BLD: 0 K/UL (ref 0–0.01)
NRBC BLD-RTO: 0 PER 100 WBC
PLATELET # BLD AUTO: 290 K/UL (ref 150–400)
PMV BLD AUTO: 11.8 FL (ref 8.9–12.9)
RBC # BLD AUTO: 3.86 M/UL (ref 3.8–5.2)
TIBC SERPL-MCNC: 461 UG/DL (ref 250–450)
VIT B12 SERPL-MCNC: 499 PG/ML (ref 193–986)
WBC # BLD AUTO: 5.6 K/UL (ref 3.6–11)

## 2024-03-01 ENCOUNTER — TELEPHONE (OUTPATIENT)
Age: 54
End: 2024-03-01

## 2024-03-01 RX ORDER — EPINEPHRINE 1 MG/ML
0.3 INJECTION, SOLUTION, CONCENTRATE INTRAVENOUS PRN
OUTPATIENT
Start: 2024-03-04

## 2024-03-01 RX ORDER — HEPARIN 100 UNIT/ML
500 SYRINGE INTRAVENOUS PRN
OUTPATIENT
Start: 2024-03-04

## 2024-03-01 RX ORDER — SODIUM CHLORIDE 9 MG/ML
5-250 INJECTION, SOLUTION INTRAVENOUS PRN
OUTPATIENT
Start: 2024-03-04

## 2024-03-01 RX ORDER — ALBUTEROL SULFATE 90 UG/1
4 AEROSOL, METERED RESPIRATORY (INHALATION) PRN
OUTPATIENT
Start: 2024-03-04

## 2024-03-01 RX ORDER — ACETAMINOPHEN 325 MG/1
650 TABLET ORAL
OUTPATIENT
Start: 2024-03-04

## 2024-03-01 RX ORDER — SODIUM CHLORIDE 0.9 % (FLUSH) 0.9 %
5-40 SYRINGE (ML) INJECTION PRN
OUTPATIENT
Start: 2024-03-04

## 2024-03-01 RX ORDER — ONDANSETRON 2 MG/ML
8 INJECTION INTRAMUSCULAR; INTRAVENOUS
OUTPATIENT
Start: 2024-03-04

## 2024-03-01 RX ORDER — SODIUM CHLORIDE 9 MG/ML
INJECTION, SOLUTION INTRAVENOUS CONTINUOUS
OUTPATIENT
Start: 2024-03-04

## 2024-03-01 RX ORDER — DIPHENHYDRAMINE HYDROCHLORIDE 50 MG/ML
50 INJECTION INTRAMUSCULAR; INTRAVENOUS
OUTPATIENT
Start: 2024-03-04

## 2024-03-01 NOTE — PROGRESS NOTES
[facility] [place] [facility]  [phone]    03/01/24 2:42 PM EST -   Recent blood work suggestive of iron deficiency.  Placed order for Venofer x5 ideally to begin within the week.   Scheduling team to notify patient.      DANDRE Hooks-CNP

## 2024-03-01 NOTE — TELEPHONE ENCOUNTER
[facility] [place] at [facility]  [phone]    03/01/24 3:36 PM EST - Attempted to reach the patient via  to get her scheduled for her iron infusions.  There was no answer.  Left a voicemail for the patient to call back at their earliest convenience along with the phone number to our office.